# Patient Record
Sex: MALE | Race: BLACK OR AFRICAN AMERICAN | NOT HISPANIC OR LATINO | URBAN - METROPOLITAN AREA
[De-identification: names, ages, dates, MRNs, and addresses within clinical notes are randomized per-mention and may not be internally consistent; named-entity substitution may affect disease eponyms.]

---

## 2018-04-24 ENCOUNTER — EMERGENCY (EMERGENCY)
Facility: HOSPITAL | Age: 38
LOS: 0 days | Discharge: HOME | End: 2018-04-24
Attending: EMERGENCY MEDICINE | Admitting: EMERGENCY MEDICINE
Payer: COMMERCIAL

## 2018-04-24 VITALS
HEART RATE: 56 BPM | OXYGEN SATURATION: 100 % | SYSTOLIC BLOOD PRESSURE: 161 MMHG | RESPIRATION RATE: 18 BRPM | TEMPERATURE: 98 F | DIASTOLIC BLOOD PRESSURE: 95 MMHG

## 2018-04-24 VITALS — WEIGHT: 160.06 LBS | HEIGHT: 73 IN

## 2018-04-24 DIAGNOSIS — M54.9 DORSALGIA, UNSPECIFIED: ICD-10-CM

## 2018-04-24 DIAGNOSIS — Z79.899 OTHER LONG TERM (CURRENT) DRUG THERAPY: ICD-10-CM

## 2018-04-24 PROCEDURE — 72100 X-RAY EXAM L-S SPINE 2/3 VWS: CPT | Mod: 26

## 2018-04-24 PROCEDURE — 99284 EMERGENCY DEPT VISIT MOD MDM: CPT

## 2018-04-24 PROCEDURE — 72070 X-RAY EXAM THORAC SPINE 2VWS: CPT | Mod: 26

## 2018-04-24 RX ORDER — METHOCARBAMOL 500 MG/1
2 TABLET, FILM COATED ORAL
Qty: 30 | Refills: 0 | OUTPATIENT
Start: 2018-04-24 | End: 2018-04-28

## 2018-04-24 RX ORDER — IBUPROFEN 200 MG
600 TABLET ORAL ONCE
Qty: 0 | Refills: 0 | Status: COMPLETED | OUTPATIENT
Start: 2018-04-24 | End: 2018-04-24

## 2018-04-24 RX ORDER — IBUPROFEN 200 MG
1 TABLET ORAL
Qty: 28 | Refills: 0 | OUTPATIENT
Start: 2018-04-24 | End: 2018-04-30

## 2018-04-24 RX ORDER — METHOCARBAMOL 500 MG/1
1000 TABLET, FILM COATED ORAL ONCE
Qty: 0 | Refills: 0 | Status: COMPLETED | OUTPATIENT
Start: 2018-04-24 | End: 2018-04-24

## 2018-04-24 RX ADMIN — METHOCARBAMOL 1000 MILLIGRAM(S): 500 TABLET, FILM COATED ORAL at 19:38

## 2018-04-24 RX ADMIN — Medication 600 MILLIGRAM(S): at 18:55

## 2018-04-24 NOTE — ED STATDOCS - NS_ ATTENDINGSCRIBEDETAILS _ED_A_ED_FT
I, Darrell White MD,  performed the initial face to face bedside interview with this patient regarding history of present illness, review of symptoms and relevant past medical, social and family history.  I completed an independent physical examination.    The history, relevant review of systems, past medical and surgical history, medical decision making, and physical examination was documented by the scribe in my presence and I attest to the accuracy of the documentation.

## 2018-04-24 NOTE — ED STATDOCS - ATTENDING CONTRIBUTION TO CARE
I, Darrell White MD,  performed the initial face to face bedside interview with this patient regarding history of present illness, review of symptoms and relevant past medical, social and family history.  I completed an independent physical examination.  I was the initial provider who evaluated this patient. I have signed out the follow up of any pending tests (i.e. labs, radiological studies) to the ACP.  I have communicated the patient’s plan of care and disposition with the ACP.

## 2018-04-24 NOTE — ED ADULT NURSE NOTE - CHPI ED SYMPTOMS NEG
no tingling/no deformity/no numbness/no abrasion/no bleeding/no fever/no loss of consciousness/no vomiting/no weakness/no confusion

## 2018-04-24 NOTE — ED STATDOCS - PROGRESS NOTE DETAILS
ASA Scales:   Patient has been seen, evaluated and orders have been written by the attending in intake. Patient is stable.  I will follow up the results of orders written and I will continue to evaluate/observe the patient.

## 2018-04-24 NOTE — ED ADULT TRIAGE NOTE - CHIEF COMPLAINT QUOTE
c/o slip and fall down 6 steps 2 days ago, denies hitting head, denies LOC, c/o posterior neck and back pain, pt states he has taken tylenol for pain with no relief in pain, pt does not take blood thinner, HX: depression

## 2018-04-24 NOTE — ED STATDOCS - OBJECTIVE STATEMENT
37 y/o M presents to ED for evaluation s/p fall down 6 steps. No head injury or LOC. Pt states since he fell he has had progressively worsening back pain from the base of his neck to his lower back. Pain is described as shooting. Pt has taken OTC tylenol w/o relief. No numbness, tingling, weakness, or incontinence. Pt notes he lifts heavy objects at work.

## 2018-07-24 ENCOUNTER — EMERGENCY (EMERGENCY)
Facility: HOSPITAL | Age: 38
LOS: 0 days | Discharge: HOME | End: 2018-07-24
Attending: STUDENT IN AN ORGANIZED HEALTH CARE EDUCATION/TRAINING PROGRAM | Admitting: STUDENT IN AN ORGANIZED HEALTH CARE EDUCATION/TRAINING PROGRAM
Payer: SELF-PAY

## 2018-07-24 VITALS
OXYGEN SATURATION: 100 % | HEIGHT: 73 IN | DIASTOLIC BLOOD PRESSURE: 101 MMHG | SYSTOLIC BLOOD PRESSURE: 159 MMHG | WEIGHT: 160.06 LBS | RESPIRATION RATE: 16 BRPM | TEMPERATURE: 98 F | HEART RATE: 60 BPM

## 2018-07-24 DIAGNOSIS — R60.0 LOCALIZED EDEMA: ICD-10-CM

## 2018-07-24 DIAGNOSIS — M79.645 PAIN IN LEFT FINGER(S): ICD-10-CM

## 2018-07-24 DIAGNOSIS — L03.114 CELLULITIS OF LEFT UPPER LIMB: ICD-10-CM

## 2018-07-24 DIAGNOSIS — S60.512A ABRASION OF LEFT HAND, INITIAL ENCOUNTER: ICD-10-CM

## 2018-07-24 PROCEDURE — 99284 EMERGENCY DEPT VISIT MOD MDM: CPT

## 2018-07-24 PROCEDURE — 73130 X-RAY EXAM OF HAND: CPT | Mod: 26,LT

## 2018-07-24 RX ORDER — MUPIROCIN 20 MG/G
1 OINTMENT TOPICAL
Qty: 1 | Refills: 0 | OUTPATIENT
Start: 2018-07-24 | End: 2018-08-02

## 2018-07-24 RX ORDER — IBUPROFEN 200 MG
600 TABLET ORAL ONCE
Qty: 0 | Refills: 0 | Status: COMPLETED | OUTPATIENT
Start: 2018-07-24 | End: 2018-07-24

## 2018-07-24 RX ORDER — TETANUS TOXOID, REDUCED DIPHTHERIA TOXOID AND ACELLULAR PERTUSSIS VACCINE, ADSORBED 5; 2.5; 8; 8; 2.5 [IU]/.5ML; [IU]/.5ML; UG/.5ML; UG/.5ML; UG/.5ML
0.5 SUSPENSION INTRAMUSCULAR ONCE
Qty: 0 | Refills: 0 | Status: COMPLETED | OUTPATIENT
Start: 2018-07-24 | End: 2018-07-24

## 2018-07-24 RX ADMIN — TETANUS TOXOID, REDUCED DIPHTHERIA TOXOID AND ACELLULAR PERTUSSIS VACCINE, ADSORBED 0.5 MILLILITER(S): 5; 2.5; 8; 8; 2.5 SUSPENSION INTRAMUSCULAR at 20:06

## 2018-07-24 RX ADMIN — Medication 600 MILLIGRAM(S): at 20:06

## 2018-07-24 RX ADMIN — Medication 100 MILLIGRAM(S): at 20:06

## 2018-07-24 NOTE — ED ADULT NURSE NOTE - OBJECTIVE STATEMENT
pt presents to ED s/p mechanical fall last night with abrasions to the L. 2nd finger. unknown last tetanus. swelling to the L. hand and finger. ASA Rivers removed ring from 2nd finger. pt in no acute distress at this time.

## 2018-07-24 NOTE — ED STATDOCS - SKIN, MLM
skin normal color for race, warm, dry. +abrasions distal to 3rd and 4th digit. soft tissue swelling to dorsal left hand with mild erythema, swelling, and warmth. skin normal color for race, warm, dry. +abrasions to MCP joints of 3rd and 4th digit. soft tissue swelling to dorsum of left hand with mild erythema, swelling, and warmth.

## 2018-07-24 NOTE — ED STATDOCS - PROGRESS NOTE DETAILS
Patient seen and evaluated with ED attending at intake.  healing abrasions to dorsum of left hand over MCP of 4th and 5th fingers.  L ring finger swollen, lubricant used to remove ring in place and patient instructed not to wear it until infection clears.  No free air on xray, patient afebrile, denies fever and chills.  Will trial PO antibiotics outpatient, strict return precautions for worsening infection, fever, chills reviewed with patient -Silvestre Mac PA-C

## 2018-07-24 NOTE — ED STATDOCS - OBJECTIVE STATEMENT
39 y/o male with no pertinent PMHx presents to the ED s/p mechanical fall last night c/o left finger pain with abrasions and new swelling and erythema. Currently does not have a PMD. Pt is unsure if Tetanus is UTD. 39 y/o male with no pertinent PMHx presents to the ED s/p mechanical fall 1 week ago; c/o left finger pain with abrasions and new swelling and erythema since last night; no hx of diabetes; no fever or chills; Currently does not have a PMD. Pt is unsure if Tetanus is UTD.

## 2018-07-24 NOTE — ED STATDOCS - ATTENDING CONTRIBUTION TO CARE
I, Jessica Pelaez DO,  performed the initial face to face bedside interview with this patient regarding history of present illness, review of symptoms and relevant past medical, social and family history.  I completed an independent physical examination.  I was the initial provider who evaluated this patient. I have signed out the follow up of any pending tests (i.e. labs, radiological studies) to the ACP.  I have communicated the patient’s plan of care and disposition with the ACP.  The history, relevant review of systems, past medical and surgical history, medical decision making, and physical examination was documented by the scribe in my presence and I attest to the accuracy of the documentation.

## 2018-07-24 NOTE — ED ADULT NURSE NOTE - CHPI ED SYMPTOMS NEG
no fever/no nausea/no numbness/no chills/no weakness/no dizziness/no tingling/no vomiting/no decreased eating/drinking

## 2018-07-24 NOTE — ED ADULT TRIAGE NOTE - CHIEF COMPLAINT QUOTE
left hand lac sustained last week now with redness, swelling and increased pain. denies fevers, chills.

## 2019-05-15 ENCOUNTER — EMERGENCY (EMERGENCY)
Facility: HOSPITAL | Age: 39
LOS: 0 days | Discharge: HOME | End: 2019-05-15
Attending: EMERGENCY MEDICINE | Admitting: EMERGENCY MEDICINE
Payer: COMMERCIAL

## 2019-05-15 VITALS — HEIGHT: 71 IN | WEIGHT: 154.98 LBS

## 2019-05-15 VITALS
HEART RATE: 59 BPM | RESPIRATION RATE: 16 BRPM | OXYGEN SATURATION: 100 % | SYSTOLIC BLOOD PRESSURE: 154 MMHG | TEMPERATURE: 99 F | DIASTOLIC BLOOD PRESSURE: 85 MMHG

## 2019-05-15 DIAGNOSIS — M25.572 PAIN IN LEFT ANKLE AND JOINTS OF LEFT FOOT: ICD-10-CM

## 2019-05-15 DIAGNOSIS — S93.402A SPRAIN OF UNSPECIFIED LIGAMENT OF LEFT ANKLE, INITIAL ENCOUNTER: ICD-10-CM
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
## 2019-05-15 PROCEDURE — 99283 EMERGENCY DEPT VISIT LOW MDM: CPT | Mod: 25

## 2019-05-15 PROCEDURE — 73610 X-RAY EXAM OF ANKLE: CPT | Mod: 26,LT

## 2019-05-15 RX ORDER — IBUPROFEN 200 MG
400 TABLET ORAL ONCE
Refills: 0 | Status: COMPLETED | OUTPATIENT
Start: 2019-05-15 | End: 2019-05-15

## 2019-05-15 RX ADMIN — Medication 400 MILLIGRAM(S): at 12:46

## 2019-05-15 NOTE — ED ADULT NURSE NOTE - OBJECTIVE STATEMENT
Patient states he rolled his left ankle this morning, and heard a "pop".  He states he has pain with weight bearing.

## 2019-05-15 NOTE — ED STATDOCS - CARE PROVIDER_API CALL
Antonio Olmstead (DO)  Orthopaedic Surgery  [address] [place] [address]  Phone: [phone]  Fax: [phone]  Follow Up Time: 4-6 Days

## 2019-05-15 NOTE — ED STATDOCS - NSFOLLOWUPINSTRUCTIONS_ED_ALL_ED_FT
Sprain    WHAT YOU NEED TO KNOW:    A sprain happens when a ligament is stretched or torn. Ligaments are tough tissues that connect bones. Ligaments support your joints and keep your bones in place. They allow you to lift, lower, or rotate your arms and legs. A sprain may involve one or more ligaments.     DISCHARGE INSTRUCTIONS:    Return to the emergency department if:     You have numbness or tingling below the injury, such as in your fingers or toes.      The skin over your sprained area is blue or pale.       Your pain has increased or returned, even after you take pain medicine.    Contact your healthcare provider if:     Your symptoms do not better.      Your swelling has increased or returned.      Your joint becomes more weak or unstable.      You have questions or concerns about your condition or care.    Medicines:     Prescription pain medicine may be given. Ask how to take this medicine safely.      Acetaminophen decreases pain and fever. It is available without a doctor's order. Ask how much to take and how often to take it. Follow directions. Acetaminophen can cause liver damage if not taken correctly.      NSAIDs, such as ibuprofen, help decrease swelling, pain, and fever. This medicine is available with or without a doctor's order. NSAIDs can cause stomach bleeding or kidney problems in certain people. If you take blood thinner medicine, always ask your healthcare provider if NSAIDs are safe for you. Always read the medicine label and follow directions.      Take your medicine as directed. Contact your healthcare provider if you think your medicine is not helping or if you have side effects. Tell him or her if you are allergic to any medicine. Keep a list of the medicines, vitamins, and herbs you take. Include the amounts, and when and why you take them. Bring the list or the pill bottles to follow-up visits. Carry your medicine list with you in case of an emergency.    Support devices: Support services, such as an elastic bandage, splint, brace, or cast may be needed. These devices limit your movement and protect your joint. You may need to use crutches if the sprain is in your leg. This will help decrease your pain as you move around.     Self-care:     Rest your joint so that it can heal. Return to normal activities as directed.      Apply ice on your injury for 15 to 20 minutes every hour or as directed. Use an ice pack, or put crushed ice in a plastic bag. Cover it with a towel. Ice helps prevent tissue damage and decreases swelling and pain.      Compress the injured area as directed. Ask your healthcare provider if you should wrap an elastic bandage around your injured ligament. An elastic bandage provides support and helps decrease swelling and movement so your joint can heal.       Elevate the injured area above the level of your heart as often as you can. This will help decrease swelling and pain. Prop the injured area on pillows or blankets to keep it elevated comfortably.     Physical therapy: A physical therapist teaches you exercises to help improve movement and strength, and to decrease pain.     Prevent another sprain: Regular exercise can strengthen your muscles and help prevent another injury. Do the following before you begin or return to regular exercise or sports training:     Ask your healthcare provider about the activities you can do. Find out how long your ligament needs to heal. Do not do any physical activity until your healthcare provider says it is okay. If you start activity too soon, you may develop a more serious injury.       Always warm up and stretch before your regular exercise, sport, or physical activity.       Take it slow. Slowly increase how often and how long you exercise or train. Sudden increases in how often you train may cause you to overstretch or tear your ligament.       Use the right equipment. Always wear shoes that fit well and are made for the activity that you are doing. You may also use ankle supports, elbow and knee pads, or braces.     Follow up with your healthcare provider as directed: Write down your questions so you remember to ask them during your visits.

## 2019-05-15 NOTE — ED STATDOCS - ATTENDING CONTRIBUTION TO CARE
I, Regine Dailey MD, personally saw the patient with ACP.  I have personally performed a face to face diagnostic evaluation on this patient.  I have reviewed the ACP note and agree with the history, exam, and plan of care, except as noted.

## 2019-05-15 NOTE — ED STATDOCS - PROGRESS NOTE DETAILS
38 y/o M presents with L ankle pain. pt states he rolled his ankle and felt a pop, pain worse with ambulating. Denies numbness or tingling, motor or sensory deficit or other complaints at this time. -Negro Leal PA-C Xr negative. Ace bandage and stirrup applied, will dc with crutches. Recommend RICE therapy, FU with ortho.  Pt asked to return to ED immediately for any new or concerning sx or worsening sx. Pt acknowledges and understands plan. -Negro Leal PA-C

## 2019-05-15 NOTE — ED STATDOCS - OBJECTIVE STATEMENT
38 y/o male with no significant PMHx presents to the ED c/o left ankle pain. Pt notes he rolled his ankle this morning and heard a pop. Pt able to ambulate with some pain. No other complaints at this time.

## 2019-12-09 ENCOUNTER — EMERGENCY (EMERGENCY)
Facility: HOSPITAL | Age: 39
LOS: 0 days | Discharge: HOME | End: 2019-12-09
Attending: EMERGENCY MEDICINE
Payer: COMMERCIAL

## 2019-12-09 VITALS
HEIGHT: 78 IN | WEIGHT: 169.98 LBS | OXYGEN SATURATION: 100 % | SYSTOLIC BLOOD PRESSURE: 171 MMHG | HEART RATE: 88 BPM | DIASTOLIC BLOOD PRESSURE: 105 MMHG | RESPIRATION RATE: 18 BRPM | TEMPERATURE: 98 F

## 2019-12-09 DIAGNOSIS — S60.415A ABRASION OF LEFT RING FINGER, INITIAL ENCOUNTER: ICD-10-CM

## 2019-12-09 DIAGNOSIS — S60.455A SUPERFICIAL FOREIGN BODY OF LEFT RING FINGER, INITIAL ENCOUNTER: ICD-10-CM
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
## 2019-12-09 DIAGNOSIS — F17.200 NICOTINE DEPENDENCE, UNSPECIFIED, UNCOMPLICATED: ICD-10-CM
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
## 2019-12-09 DIAGNOSIS — I10 ESSENTIAL (PRIMARY) HYPERTENSION: ICD-10-CM

## 2019-12-09 PROCEDURE — 99282 EMERGENCY DEPT VISIT SF MDM: CPT

## 2019-12-09 RX ORDER — TETANUS TOXOID, REDUCED DIPHTHERIA TOXOID AND ACELLULAR PERTUSSIS VACCINE, ADSORBED 5; 2.5; 8; 8; 2.5 [IU]/.5ML; [IU]/.5ML; UG/.5ML; UG/.5ML; UG/.5ML
0.5 SUSPENSION INTRAMUSCULAR ONCE
Refills: 0 | Status: DISCONTINUED | OUTPATIENT
Start: 2019-12-09 | End: 2019-12-09

## 2019-12-09 NOTE — ED ADULT NURSE NOTE - NSIMPLEMENTINTERV_GEN_ALL_ED
Implemented All Universal Safety Interventions:  [place] to call system. Call bell, personal items and telephone within reach. Instruct patient to call for assistance. Room bathroom lighting operational. Non-slip footwear when patient is off stretcher. Physically safe environment: no spills, clutter or unnecessary equipment. Stretcher in lowest position, wheels locked, appropriate side rails in place.

## 2019-12-09 NOTE — ED ADULT TRIAGE NOTE - CHIEF COMPLAINT QUOTE
Patient presents with left ringer swelling, states he injured it two days ago and now his wedding band is stuck

## 2019-12-09 NOTE — ED STATDOCS - PROGRESS NOTE DETAILS
Patient is a 40 y/o male with PMHx of HTN who presents with a stuck ring in his left 4th finger. Patient has been wearing it for 2 years without any problems but noticed some swelling yesterday. Patient tried to remove his ring but was unable to remove because of swelling. ~ASA Gomez Metallic Ring removed using electric ring cutter. No injury to finger. Small abrasion on proximal phalanx cleansed with NS, bacitracin DSD applied. Tetanus updated.     Patient re-examined and re-evaluated. Patient feels much better at this time. ED evaluation, Diagnosis and management discussed with the patient in detail. Workup results discussed with ED attending, OK to dc home. Close PMD follow up encouraged.  Strict ED return instructions discussed in detail and patient given the opportunity to ask any questions about their discharge diagnosis and instructions. Patient verbalized understanding. ~ ASA Gomez Patient is a 40 y/o male with PMHx of HTN who presents with a stuck ring in his left 4th finger. Patient has been wearing it for 2 years without any problems but noticed some swelling yesterday. Patient tried to remove his ring but was unable to remove because of swelling. Last tetanus 2 years ago. ~ASA Gomez Metallic Ring removed using electric ring cutter. No injury to finger. Small abrasion on proximal phalanx cleansed with NS, bacitracin DSD applied.      Patient re-examined and re-evaluated. Patient feels much better at this time. ED evaluation, Diagnosis and management discussed with the patient in detail. Workup results discussed with ED attending, OK to MN home. Close PMD follow up encouraged.  Strict ED return instructions discussed in detail and patient given the opportunity to ask any questions about their discharge diagnosis and instructions. Patient verbalized understanding. ~ ASA Gomez

## 2019-12-09 NOTE — ED STATDOCS - PHYSICAL EXAMINATION
GEN: AOX3, NAD. HEENT: Throat clear. Airway is patent. EYES: PERRLA. EOMI. Head: NC/AT. NECK: Supple, No JVD. FROM. C-spine non-tender. CV:S1S2, RRR, LUNGS: CTA b/l, no w/r/r. CHEST: Equal chest expansion and rise. No deformity. ABD: Soft, NT/ND, no rebound, no guarding. No CVAT. EXT: No e/c/c. 2+ distal pulses. LEFT RING FINGER: +metal ring noted at base of left 4th digit's proximal phalanx. Mild swelling surrounding ring. +Mild diffuse tenderness. NVI. No vascular compromise. Cap refill less than 2 sec. No tendon deficits. No bony deformity. SKIN: No rashes. NEURO: No focal deficits. CN II-XII intact. FROM. 5/5 motor and sensory. ASA Gomez

## 2019-12-09 NOTE — ED PROCEDURE NOTE - CPROC ED FOREIGN BODY DETAIL1
electric ring cutter used to cut ring successfully. Nose pliers used to split open the ring. Bacitracin DSD applied.

## 2019-12-09 NOTE — ED STATDOCS - DATE/TIME 1
MARU Fang, medical assistant, will fax script for Phrenilin to patient's pharmacy.    Called patient and left detailed message on identified voicemail relaying message per below from Dr. Beavers and that butalbital is being faxed to pharmacy.      JULY De La Rosa, KEVINN, RN     09-Dec-2019 14:31

## 2019-12-09 NOTE — ED STATDOCS - OBJECTIVE STATEMENT
38 y/o male with a PMHx of HTN presents to the ED c/o left fourth digit swelling since yesterday. Pt unable to get his ring off. No trauma to finger. Smoker. NKDA. No other complaints at this time.

## 2019-12-09 NOTE — ED STATDOCS - PATIENT PORTAL LINK FT
You can access the FollowMyHealth Patient Portal offered by [facility] by registering at the following website: [email or web address]. By joining [employer]’s FollowMyHealth portal, you will also be able to view your health information using other applications (apps) compatible with our system.

## 2019-12-24 ENCOUNTER — EMERGENCY (EMERGENCY)
Facility: HOSPITAL | Age: 39
LOS: 0 days | Discharge: HOME | End: 2019-12-24
Attending: EMERGENCY MEDICINE
Payer: COMMERCIAL

## 2019-12-24 VITALS
RESPIRATION RATE: 18 BRPM | HEART RATE: 56 BPM | DIASTOLIC BLOOD PRESSURE: 102 MMHG | SYSTOLIC BLOOD PRESSURE: 153 MMHG | TEMPERATURE: 99 F | OXYGEN SATURATION: 100 %

## 2019-12-24 VITALS — WEIGHT: 167.99 LBS | HEIGHT: 72 IN

## 2019-12-24 DIAGNOSIS — R50.9 FEVER, UNSPECIFIED: ICD-10-CM

## 2019-12-24 DIAGNOSIS — M79.10 MYALGIA, UNSPECIFIED SITE: ICD-10-CM

## 2019-12-24 DIAGNOSIS — I10 ESSENTIAL (PRIMARY) HYPERTENSION: ICD-10-CM

## 2019-12-24 DIAGNOSIS — B34.9 VIRAL INFECTION, UNSPECIFIED: ICD-10-CM

## 2019-12-24 DIAGNOSIS — R10.9 UNSPECIFIED ABDOMINAL PAIN: ICD-10-CM

## 2019-12-24 LAB
ALBUMIN SERPL ELPH-MCNC: 3.8 G/DL — SIGNIFICANT CHANGE UP (ref 3.3–5)
ALP SERPL-CCNC: 71 U/L — SIGNIFICANT CHANGE UP (ref 40–120)
ALT FLD-CCNC: 42 U/L — SIGNIFICANT CHANGE UP (ref 12–78)
ANION GAP SERPL CALC-SCNC: 1 MMOL/L — LOW (ref 5–17)
AST SERPL-CCNC: 47 U/L — HIGH (ref 15–37)
BASOPHILS # BLD AUTO: 0.02 K/UL — SIGNIFICANT CHANGE UP (ref 0–0.2)
BASOPHILS NFR BLD AUTO: 0.4 % — SIGNIFICANT CHANGE UP (ref 0–2)
BILIRUB SERPL-MCNC: 0.4 MG/DL — SIGNIFICANT CHANGE UP (ref 0.2–1.2)
BUN SERPL-MCNC: 10 MG/DL — SIGNIFICANT CHANGE UP (ref 7–23)
CALCIUM SERPL-MCNC: 8.9 MG/DL — SIGNIFICANT CHANGE UP (ref 8.5–10.1)
CHLORIDE SERPL-SCNC: 109 MMOL/L — HIGH (ref 96–108)
CO2 SERPL-SCNC: 31 MMOL/L — SIGNIFICANT CHANGE UP (ref 22–31)
CREAT SERPL-MCNC: 1.12 MG/DL — SIGNIFICANT CHANGE UP (ref 0.5–1.3)
EOSINOPHIL # BLD AUTO: 0.07 K/UL — SIGNIFICANT CHANGE UP (ref 0–0.5)
EOSINOPHIL NFR BLD AUTO: 1.5 % — SIGNIFICANT CHANGE UP (ref 0–6)
FLU A RESULT: SIGNIFICANT CHANGE UP
FLU A RESULT: SIGNIFICANT CHANGE UP
FLUAV AG NPH QL: SIGNIFICANT CHANGE UP
FLUBV AG NPH QL: SIGNIFICANT CHANGE UP
GLUCOSE SERPL-MCNC: 99 MG/DL — SIGNIFICANT CHANGE UP (ref 70–99)
HCT VFR BLD CALC: 42.8 % — SIGNIFICANT CHANGE UP (ref 39–50)
HGB BLD-MCNC: 13.8 G/DL — SIGNIFICANT CHANGE UP (ref 13–17)
IMM GRANULOCYTES NFR BLD AUTO: 0.2 % — SIGNIFICANT CHANGE UP (ref 0–1.5)
LYMPHOCYTES # BLD AUTO: 1.64 K/UL — SIGNIFICANT CHANGE UP (ref 1–3.3)
LYMPHOCYTES # BLD AUTO: 34.1 % — SIGNIFICANT CHANGE UP (ref 13–44)
MCHC RBC-ENTMCNC: 29.4 PG — SIGNIFICANT CHANGE UP (ref 27–34)
MCHC RBC-ENTMCNC: 32.2 GM/DL — SIGNIFICANT CHANGE UP (ref 32–36)
MCV RBC AUTO: 91.3 FL — SIGNIFICANT CHANGE UP (ref 80–100)
MONOCYTES # BLD AUTO: 0.64 K/UL — SIGNIFICANT CHANGE UP (ref 0–0.9)
MONOCYTES NFR BLD AUTO: 13.3 % — SIGNIFICANT CHANGE UP (ref 2–14)
NEUTROPHILS # BLD AUTO: 2.43 K/UL — SIGNIFICANT CHANGE UP (ref 1.8–7.4)
NEUTROPHILS NFR BLD AUTO: 50.5 % — SIGNIFICANT CHANGE UP (ref 43–77)
PLATELET # BLD AUTO: 180 K/UL — SIGNIFICANT CHANGE UP (ref 150–400)
POTASSIUM SERPL-MCNC: 4.1 MMOL/L — SIGNIFICANT CHANGE UP (ref 3.5–5.3)
POTASSIUM SERPL-SCNC: 4.1 MMOL/L — SIGNIFICANT CHANGE UP (ref 3.5–5.3)
PROT SERPL-MCNC: 7.3 GM/DL — SIGNIFICANT CHANGE UP (ref 6–8.3)
RBC # BLD: 4.69 M/UL — SIGNIFICANT CHANGE UP (ref 4.2–5.8)
RBC # FLD: 13.9 % — SIGNIFICANT CHANGE UP (ref 10.3–14.5)
RSV RESULT: SIGNIFICANT CHANGE UP
RSV RNA RESP QL NAA+PROBE: SIGNIFICANT CHANGE UP
SODIUM SERPL-SCNC: 141 MMOL/L — SIGNIFICANT CHANGE UP (ref 135–145)
WBC # BLD: 4.81 K/UL — SIGNIFICANT CHANGE UP (ref 3.8–10.5)
WBC # FLD AUTO: 4.81 K/UL — SIGNIFICANT CHANGE UP (ref 3.8–10.5)

## 2019-12-24 PROCEDURE — 36415 COLL VENOUS BLD VENIPUNCTURE: CPT

## 2019-12-24 PROCEDURE — 87631 RESP VIRUS 3-5 TARGETS: CPT

## 2019-12-24 PROCEDURE — 96374 THER/PROPH/DIAG INJ IV PUSH: CPT

## 2019-12-24 PROCEDURE — 99284 EMERGENCY DEPT VISIT MOD MDM: CPT

## 2019-12-24 PROCEDURE — 80053 COMPREHEN METABOLIC PANEL: CPT

## 2019-12-24 PROCEDURE — 99284 EMERGENCY DEPT VISIT MOD MDM: CPT | Mod: 25

## 2019-12-24 PROCEDURE — 85025 COMPLETE CBC W/AUTO DIFF WBC: CPT

## 2019-12-24 RX ORDER — KETOROLAC TROMETHAMINE 30 MG/ML
30 SYRINGE (ML) INJECTION ONCE
Refills: 0 | Status: DISCONTINUED | OUTPATIENT
Start: 2019-12-24 | End: 2019-12-24

## 2019-12-24 RX ORDER — ACETAMINOPHEN 500 MG
975 TABLET ORAL ONCE
Refills: 0 | Status: COMPLETED | OUTPATIENT
Start: 2019-12-24 | End: 2019-12-24

## 2019-12-24 RX ORDER — SODIUM CHLORIDE 9 MG/ML
1000 INJECTION INTRAMUSCULAR; INTRAVENOUS; SUBCUTANEOUS ONCE
Refills: 0 | Status: COMPLETED | OUTPATIENT
Start: 2019-12-24 | End: 2019-12-24

## 2019-12-24 RX ADMIN — Medication 975 MILLIGRAM(S): at 16:42

## 2019-12-24 RX ADMIN — Medication 30 MILLIGRAM(S): at 16:41

## 2019-12-24 RX ADMIN — SODIUM CHLORIDE 1000 MILLILITER(S): 9 INJECTION INTRAMUSCULAR; INTRAVENOUS; SUBCUTANEOUS at 16:41

## 2019-12-24 NOTE — ED STATDOCS - PROGRESS NOTE DETAILS
39 yr. old male PMH: HTN presents to ED with body aches and chills onset yesterday afternoon with subjective fever today. +abdominal pain. No N/V/D. Taking Sudafed without much relief. Seen an examined by attending in Intake. Plan: IV,IVF, Labs, CT abd./pelvis. Will F/U with results and re evaluate. Mundo NP Reviewed results of Lab work with negative Flu. Reports he is feeling better. Instructed to drink plenty of fluids. Take Ibuprofen or Tylenol for pain and F/U with PMD. Mundo NP

## 2019-12-24 NOTE — ED STATDOCS - CARE PROVIDER_API CALL
Co, Fermin PHOENIX)  Internal Medicine  [address] [place] [address]  Phone: [phone]  Fax: [phone]  Follow Up Time:     Ronald Anna)  Family Medicine  [address] [place] [address]  Phone: [phone]  Fax: [phone]  Follow Up Time:

## 2019-12-24 NOTE — ED STATDOCS - ENMT, MLM
Nasal mucosa clear.  Mouth with normal mucosa  Throat has no vesicles, no oropharyngeal exudates and uvula is midline. no lymphadenopathy

## 2019-12-24 NOTE — ED STATDOCS - PATIENT PORTAL LINK FT
You can access the FollowMyHealth Patient Portal offered by [facility] by registering at the following website: [email or web address]. By joining [employer]’s FollowMyHealth portal, you will also be able to view your health information using other applications (apps) compatible with our system.

## 2019-12-24 NOTE — ED ADULT TRIAGE NOTE - CHIEF COMPLAINT QUOTE
Patient comes in with flu symptoms x 2 days. Patient c/o cough, body aches, headache and chills. Patient cp/sob.

## 2019-12-24 NOTE — ED ADULT NURSE NOTE - NSFALLRSKPASTHIST_ED_ALL_ED
Cardiology Clinic Progress Note  Sandra K Severson MRN# [id number]   YOB: 1950 Age: 67 year old     Reason For Visit:  7-month follow-up    Primary Cardiologist:   Dr. Tovar          History of Presenting Illness:    Sandra K Severson is a pleasant 67 year old patient with a past cardiac history significant for CAD status post inferior MI 2001 with thrombectomy and stent to the  RCA, stent to the mid circumflex in 2016, hypertension, and hyperlipidemia.    Pt was last seen by Dr. Tovar 2/7/2018.  She had been doing well without any cardiac symptoms.  Prior stress test showed no ischemia or infarct. Most recent echocardiogram 2016 with LVEF 50-55%, slight basilar inferolateral hypokinesis, no regional wall motion abnormality, and no significant valvular disease. She continued to try and lose weight and six-month follow-up was recommended.      Pt presents today for six-month follow-up.  She has moved to [place] but wishes to continue following with our cardiology group.  She does see her PCP in [facility] where she lives.  BMP today showing normal renal function and electrolytes.  Lipid profile with total cholesterol 150 HDL 59 LDL 74 and triglycerides 87.  These results were reviewed with her today.  Since she was last seen, she has not had any cardiac complaints.  She denies any angina.  She has not been checking her blood pressures at home recently.  Blood pressure today in the clinic is mildly elevated at 138/81.  She has noted that in the past her blood pressures have been elevated when she comes to the clinic but have been well controlled at home.  She will check blood pressures at home and call us if they are greater than 130 systolically. Patient reports no chest pain, shortness of breath, PND, orthopnea, presyncope, syncope, edema, heart racing, or palpitations.      Current Cardiac Medications    metoprolol XL 50 mg daily  Lisinopril 30 mg daily  Atorvastatin 80 mg  daily  Nitroglycerin p.r.n.  Aspirin 81 mg daily   Zetia 10 mg daily                     Assessment and Plan:     Plan  1.  Call the clinic if your blood pressure is consistently greater than 130/80.   2.  Echocardiogram to reassess LV function, per Dr. Tovar  3.  Follow-up with cardiologist in 6 months      1. CAD    status post inferior MI 2001 with stent to the RCA and stent to the midcircumflex in 2016    no angina    Continue statin, aspirin, ACE inhibitor, beta blocker      2. Hypertension    not well controlled in clinic but controlled at home     continue metoprolol, lisinopril      3. Hyperlipidemia    LDL 74 today    continue atorvastatin 80 mg daily and zetia          Thank you for allowing me to participate in this delightful patient's care.      This note was completed in part using Dragon voice recognition software. Although reviewed after completion, some word and grammatical errors may occur.    TIMOTHY Rooney, CNP           Data:   All laboratory data reviewed     no

## 2019-12-24 NOTE — ED STATDOCS - OBJECTIVE STATEMENT
38 y/o male with a PMHx of HTN presents to the ED c/o body aches and chills starting yesterday afternoon and a subjective fever today. Pt has some abd pain but no vomiting or diarrhea. Pt took Sudafed with no relief. Pt did not get a flu shot this year.

## 2019-12-24 NOTE — ED ADULT NURSE NOTE - OBJECTIVE STATEMENT
pt presents to Ed with complaints of chills, body aches, and abdominal pain. pt states symptoms started yesterday. 18 g placed to left AC. labs sent.

## 2021-07-27 ENCOUNTER — EMERGENCY (EMERGENCY)
Facility: HOSPITAL | Age: 41
LOS: 0 days | Discharge: HOME | End: 2021-07-27
Attending: STUDENT IN AN ORGANIZED HEALTH CARE EDUCATION/TRAINING PROGRAM
Payer: MEDICAID

## 2021-07-27 VITALS
OXYGEN SATURATION: 99 % | DIASTOLIC BLOOD PRESSURE: 96 MMHG | RESPIRATION RATE: 16 BRPM | SYSTOLIC BLOOD PRESSURE: 135 MMHG | HEART RATE: 67 BPM

## 2021-07-27 VITALS — WEIGHT: 175.05 LBS | HEIGHT: 72 IN

## 2021-07-27 DIAGNOSIS — M54.5 LOW BACK PAIN: ICD-10-CM
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
## 2021-07-27 DIAGNOSIS — I10 ESSENTIAL (PRIMARY) HYPERTENSION: ICD-10-CM
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
## 2021-07-27 PROCEDURE — 99284 EMERGENCY DEPT VISIT MOD MDM: CPT

## 2021-07-27 PROCEDURE — 96372 THER/PROPH/DIAG INJ SC/IM: CPT

## 2021-07-27 PROCEDURE — 99283 EMERGENCY DEPT VISIT LOW MDM: CPT | Mod: 25

## 2021-07-27 RX ORDER — IBUPROFEN 200 MG
1 TABLET ORAL
Qty: 20 | Refills: 0
Start: 2021-07-27 | End: 2021-07-31

## 2021-07-27 RX ORDER — LIDOCAINE 4 G/100G
1 CREAM TOPICAL ONCE
Refills: 0 | Status: DISCONTINUED | OUTPATIENT
Start: 2021-07-27 | End: 2021-07-27

## 2021-07-27 RX ORDER — METHOCARBAMOL 500 MG/1
1 TABLET, FILM COATED ORAL
Qty: 9 | Refills: 0
Start: 2021-07-27 | End: 2021-07-29

## 2021-07-27 RX ORDER — METHOCARBAMOL 500 MG/1
750 TABLET, FILM COATED ORAL ONCE
Refills: 0 | Status: COMPLETED | OUTPATIENT
Start: 2021-07-27 | End: 2021-07-27

## 2021-07-27 RX ORDER — LIDOCAINE 4 G/100G
1 CREAM TOPICAL
Qty: 5 | Refills: 0
Start: 2021-07-27 | End: 2021-07-31

## 2021-07-27 RX ORDER — KETOROLAC TROMETHAMINE 30 MG/ML
30 SYRINGE (ML) INJECTION ONCE
Refills: 0 | Status: DISCONTINUED | OUTPATIENT
Start: 2021-07-27 | End: 2021-07-27

## 2021-07-27 RX ORDER — LIDOCAINE 4 G/100G
1 CREAM TOPICAL ONCE
Refills: 0 | Status: COMPLETED | OUTPATIENT
Start: 2021-07-27 | End: 2021-07-27

## 2021-07-27 RX ADMIN — Medication 30 MILLIGRAM(S): at 09:09

## 2021-07-27 RX ADMIN — METHOCARBAMOL 750 MILLIGRAM(S): 500 TABLET, FILM COATED ORAL at 09:09

## 2021-07-27 RX ADMIN — LIDOCAINE 1 PATCH: 4 CREAM TOPICAL at 09:08

## 2021-07-27 NOTE — ED PROVIDER NOTE - PROGRESS NOTE DETAILS
Latanya DO: Patient feeling better at this time; pain control; ambulatory in ED; f/u with pmd in 1 day without fail; bp improved; strict return precautions given.

## 2021-07-27 NOTE — ED PROVIDER NOTE - PATIENT PORTAL LINK FT
You can access the FollowMyHealth Patient Portal offered by [facility] by registering at the following website: [email or web address]. By joining [employer]’s FollowMyHealth portal, you will also be able to view your health information using other applications (apps) compatible with our system.

## 2021-07-27 NOTE — ED PROVIDER NOTE - MUSCULOSKELETAL, MLM
Spine appears normal, range of motion is not limited, mild paravertebral lumbar tenderness; no midline tenderness; ambulatory in ED with steady gait;

## 2021-07-27 NOTE — ED PROVIDER NOTE - OBJECTIVE STATEMENT
Patient is a 40 yo male with lower back pain x this AM; reports heavy lifting yesterday; no trauma; no chest pain; no sob; no abdominal pain; no urinary complaints; no incontinence of bowel or bladder function; no weakness; did not take anything for pain prior to arrival; no rash.

## 2022-03-01 NOTE — ED STATDOCS - ATTENDING CONTRIBUTION TO CARE
I, Pardeep Louise, performed the initial face to face bedside interview with this patient regarding history of present illness, review of symptoms and relevant past medical, social and family history.  I completed an independent physical examination.  I was the initial provider who evaluated this patient. I have signed out the follow up of any pending tests (i.e. labs, radiological studies) to the ACP.  I have communicated the patient’s plan of care and disposition with the ACP.  The history, relevant review of systems, past medical and surgical history, medical decision making, and physical examination was documented by the scribe in my presence and I attest to the accuracy of the documentation. O-Z Plasty Text: The defect edges were debeveled with a #15 scalpel blade.  Given the location of the defect, shape of the defect and the proximity to free margins an O-Z plasty (double transposition flap) was deemed most appropriate.  Using a sterile surgical marker, the appropriate transposition flaps were drawn incorporating the defect and placing the expected incisions within the relaxed skin tension lines where possible.    The area thus outlined was incised deep to adipose tissue with a #15 scalpel blade.  The skin margins were undermined to an appropriate distance in all directions utilizing iris scissors.  Hemostasis was achieved with electrocautery.  The flaps were then transposed into place, one clockwise and the other counterclockwise, and anchored with interrupted buried subcutaneous sutures.

## 2022-03-08 ENCOUNTER — EMERGENCY (EMERGENCY)
Facility: HOSPITAL | Age: 42
LOS: 0 days | Discharge: HOME | End: 2022-03-08
Attending: EMERGENCY MEDICINE
Payer: COMMERCIAL

## 2022-03-08 VITALS
SYSTOLIC BLOOD PRESSURE: 175 MMHG | HEIGHT: 72 IN | TEMPERATURE: 99 F | HEART RATE: 57 BPM | OXYGEN SATURATION: 96 % | RESPIRATION RATE: 18 BRPM | WEIGHT: 160.06 LBS | DIASTOLIC BLOOD PRESSURE: 101 MMHG

## 2022-03-08 VITALS
DIASTOLIC BLOOD PRESSURE: 85 MMHG | HEART RATE: 61 BPM | SYSTOLIC BLOOD PRESSURE: 163 MMHG | TEMPERATURE: 98 F | RESPIRATION RATE: 18 BRPM | OXYGEN SATURATION: 100 %

## 2022-03-08 DIAGNOSIS — R11.0 NAUSEA: ICD-10-CM

## 2022-03-08 DIAGNOSIS — R51.9 HEADACHE, UNSPECIFIED: ICD-10-CM

## 2022-03-08 DIAGNOSIS — I10 ESSENTIAL (PRIMARY) HYPERTENSION: ICD-10-CM

## 2022-03-08 LAB
ALBUMIN SERPL ELPH-MCNC: 4.1 G/DL — SIGNIFICANT CHANGE UP (ref 3.3–5)
ALP SERPL-CCNC: 62 U/L — SIGNIFICANT CHANGE UP (ref 40–120)
ALT FLD-CCNC: 23 U/L — SIGNIFICANT CHANGE UP (ref 12–78)
ANION GAP SERPL CALC-SCNC: 1 MMOL/L — LOW (ref 5–17)
AST SERPL-CCNC: 17 U/L — SIGNIFICANT CHANGE UP (ref 15–37)
BASOPHILS # BLD AUTO: 0.03 K/UL — SIGNIFICANT CHANGE UP (ref 0–0.2)
BASOPHILS NFR BLD AUTO: 0.4 % — SIGNIFICANT CHANGE UP (ref 0–2)
BILIRUB SERPL-MCNC: 1.4 MG/DL — HIGH (ref 0.2–1.2)
BUN SERPL-MCNC: 14 MG/DL — SIGNIFICANT CHANGE UP (ref 7–23)
CALCIUM SERPL-MCNC: 9 MG/DL — SIGNIFICANT CHANGE UP (ref 8.5–10.1)
CHLORIDE SERPL-SCNC: 107 MMOL/L — SIGNIFICANT CHANGE UP (ref 96–108)
CO2 SERPL-SCNC: 29 MMOL/L — SIGNIFICANT CHANGE UP (ref 22–31)
CREAT SERPL-MCNC: 1.16 MG/DL — SIGNIFICANT CHANGE UP (ref 0.5–1.3)
EGFR: 82 ML/MIN/1.73M2 — SIGNIFICANT CHANGE UP
EOSINOPHIL # BLD AUTO: 0.1 K/UL — SIGNIFICANT CHANGE UP (ref 0–0.5)
EOSINOPHIL NFR BLD AUTO: 1.5 % — SIGNIFICANT CHANGE UP (ref 0–6)
GLUCOSE SERPL-MCNC: 86 MG/DL — SIGNIFICANT CHANGE UP (ref 70–99)
HCT VFR BLD CALC: 48.4 % — SIGNIFICANT CHANGE UP (ref 39–50)
HGB BLD-MCNC: 15.7 G/DL — SIGNIFICANT CHANGE UP (ref 13–17)
IMM GRANULOCYTES NFR BLD AUTO: 0.1 % — SIGNIFICANT CHANGE UP (ref 0–1.5)
LYMPHOCYTES # BLD AUTO: 2.84 K/UL — SIGNIFICANT CHANGE UP (ref 1–3.3)
LYMPHOCYTES # BLD AUTO: 41.7 % — SIGNIFICANT CHANGE UP (ref 13–44)
MCHC RBC-ENTMCNC: 29.1 PG — SIGNIFICANT CHANGE UP (ref 27–34)
MCHC RBC-ENTMCNC: 32.4 GM/DL — SIGNIFICANT CHANGE UP (ref 32–36)
MCV RBC AUTO: 89.8 FL — SIGNIFICANT CHANGE UP (ref 80–100)
MONOCYTES # BLD AUTO: 0.44 K/UL — SIGNIFICANT CHANGE UP (ref 0–0.9)
MONOCYTES NFR BLD AUTO: 6.5 % — SIGNIFICANT CHANGE UP (ref 2–14)
NEUTROPHILS # BLD AUTO: 3.39 K/UL — SIGNIFICANT CHANGE UP (ref 1.8–7.4)
NEUTROPHILS NFR BLD AUTO: 49.8 % — SIGNIFICANT CHANGE UP (ref 43–77)
PLATELET # BLD AUTO: 200 K/UL — SIGNIFICANT CHANGE UP (ref 150–400)
POTASSIUM SERPL-MCNC: 4.6 MMOL/L — SIGNIFICANT CHANGE UP (ref 3.5–5.3)
POTASSIUM SERPL-SCNC: 4.6 MMOL/L — SIGNIFICANT CHANGE UP (ref 3.5–5.3)
PROT SERPL-MCNC: 7.2 GM/DL — SIGNIFICANT CHANGE UP (ref 6–8.3)
RBC # BLD: 5.39 M/UL — SIGNIFICANT CHANGE UP (ref 4.2–5.8)
RBC # FLD: 13.1 % — SIGNIFICANT CHANGE UP (ref 10.3–14.5)
SODIUM SERPL-SCNC: 137 MMOL/L — SIGNIFICANT CHANGE UP (ref 135–145)
WBC # BLD: 6.81 K/UL — SIGNIFICANT CHANGE UP (ref 3.8–10.5)
WBC # FLD AUTO: 6.81 K/UL — SIGNIFICANT CHANGE UP (ref 3.8–10.5)

## 2022-03-08 PROCEDURE — 96375 TX/PRO/DX INJ NEW DRUG ADDON: CPT

## 2022-03-08 PROCEDURE — 99284 EMERGENCY DEPT VISIT MOD MDM: CPT | Mod: 25

## 2022-03-08 PROCEDURE — 99285 EMERGENCY DEPT VISIT HI MDM: CPT

## 2022-03-08 PROCEDURE — 70450 CT HEAD/BRAIN W/O DYE: CPT | Mod: 26,MA

## 2022-03-08 PROCEDURE — 36415 COLL VENOUS BLD VENIPUNCTURE: CPT

## 2022-03-08 PROCEDURE — 70450 CT HEAD/BRAIN W/O DYE: CPT | Mod: MA

## 2022-03-08 PROCEDURE — 96374 THER/PROPH/DIAG INJ IV PUSH: CPT

## 2022-03-08 PROCEDURE — 80053 COMPREHEN METABOLIC PANEL: CPT

## 2022-03-08 PROCEDURE — 85025 COMPLETE CBC W/AUTO DIFF WBC: CPT

## 2022-03-08 RX ORDER — METOCLOPRAMIDE HCL 10 MG
10 TABLET ORAL ONCE
Refills: 0 | Status: COMPLETED | OUTPATIENT
Start: 2022-03-08 | End: 2022-03-08

## 2022-03-08 RX ORDER — DIPHENHYDRAMINE HCL 50 MG
25 CAPSULE ORAL ONCE
Refills: 0 | Status: COMPLETED | OUTPATIENT
Start: 2022-03-08 | End: 2022-03-08

## 2022-03-08 RX ORDER — KETOROLAC TROMETHAMINE 30 MG/ML
15 SYRINGE (ML) INJECTION ONCE
Refills: 0 | Status: DISCONTINUED | OUTPATIENT
Start: 2022-03-08 | End: 2022-03-08

## 2022-03-08 RX ORDER — SODIUM CHLORIDE 9 MG/ML
1000 INJECTION INTRAMUSCULAR; INTRAVENOUS; SUBCUTANEOUS ONCE
Refills: 0 | Status: COMPLETED | OUTPATIENT
Start: 2022-03-08 | End: 2022-03-08

## 2022-03-08 RX ADMIN — Medication 15 MILLIGRAM(S): at 18:47

## 2022-03-08 RX ADMIN — Medication 25 MILLIGRAM(S): at 18:48

## 2022-03-08 RX ADMIN — SODIUM CHLORIDE 2000 MILLILITER(S): 9 INJECTION INTRAMUSCULAR; INTRAVENOUS; SUBCUTANEOUS at 18:49

## 2022-03-08 RX ADMIN — Medication 10 MILLIGRAM(S): at 18:48

## 2022-03-08 NOTE — ED PROVIDER NOTE - PATIENT PORTAL LINK FT
You can access the FollowMyHealth Patient Portal offered by [facility] by registering at the following website: [email or web address]. By joining [employer]’s FollowMyHealth portal, you will also be able to view your health information using other applications (apps) compatible with our system.

## 2022-03-08 NOTE — ED ADULT NURSE REASSESSMENT NOTE - NS ED NURSE REASSESS COMMENT FT1
pt headache improved 2/10 at this time, CT head -, pt dc home as per Md Joshi, pt awaiting taxi, to follow up with PCP regarding BP.

## 2022-03-08 NOTE — ED ADULT TRIAGE NOTE - CHIEF COMPLAINT QUOTE
BIBA with c/o headache and R eye twitching x3 days. pt. hypertensive in triage. PMH HTN noncompliant with medications. denies other discomfort, no other abnormal neurological symptoms.

## 2022-03-08 NOTE — ED PROVIDER NOTE - PROGRESS NOTE DETAILS
pt feels much better. ED workup benign. ED evaluation and management discussed with the patient in detail.  Close PMD follow up encouraged.  Strict ED return instructions discussed in detail and patient given the opportunity to ask any questions about their discharge diagnosis and instructions. Patient verbalized understanding.

## 2022-03-08 NOTE — ED PROVIDER NOTE - OBJECTIVE STATEMENT
41 y/o male with PMHx of HTN presents to the ED c/o headache x3 days. Pt states headache started suddenly 3 days ago and has been constant since, located at the front of his head. Pt states he has been taking Ibuprofen/Tylenol without relief, last took Tylenol 400mg 2 hours ago. Pt also endorses feeling nauseas. Pt was seen at [facility] and told to come to ED for further evaluation given elevated blood pressure. Pt hypertensive upon arrival: 175/101. Pt with Hx of HTN, but is non-compliant with his medications. PCP at [facility]. TONY. 41 y/o male with PMHx of HTN presents to the ED c/o headache x3 days. Pt states headache started suddenly 3 days ago and has been constant since, located at the front of his head. Pt states he has been taking Ibuprofen/Tylenol without relief, last took Tylenol 400mg 2 hours ago. Pt also endorses feeling nauseas. Denies any recent trauma. Pt was seen at [facility] and told to come to ED for further evaluation given elevated blood pressure. Pt hypertensive upon arrival: 175/101. Pt with Hx of HTN, but is non-compliant with his medications. PCP at [facility]. TONY.

## 2022-03-08 NOTE — ED ADULT TRIAGE NOTE - BP NONINVASIVE SYSTOLIC (MM HG)
Mirtha Gary notified of arrival time 0630 for surgery on 11/28/18 with Dr. JAH Hays. At [facility]. Post Op appointment made, slip in mail.     175

## 2022-03-08 NOTE — ED PROVIDER NOTE - CONDITION AT DISCHARGE:
Delivery Date: 2018   Delivery Time: 1:39 AM   Delivery Type: , Low Transverse     Maternal History:   Dilip Kingsley is a 4 days day old 36w1d   born to a mother who is a 31 y.o.   . She has a past medical history of ADD (attention deficit disorder); Alopecia; Depression; and Mood disorder. .     Prenatal Labs Review:  ABO/Rh:   Lab Results   Component Value Date/Time    GROUPTRH B POS 2018 05:09 PM     Group B Beta Strep:   Lab Results   Component Value Date/Time    STREPBCULT No Group B Streptococcus isolated 2018 04:53 PM     HIV: 2018: HIV 1/2 Ag/Ab Negative (Ref range: Negative)  RPR:   Lab Results   Component Value Date/Time    RPR Non-reactive 2018 10:38 AM     Hepatitis B Surface Antigen:   Lab Results   Component Value Date/Time    HEPBSAG Negative 2018 10:38 AM     Rubella Immune Status:   Lab Results   Component Value Date/Time    RUBELLAIMMUN Reactive 2018 01:59 PM       Pregnancy/Delivery Course (synopsis of major diagnoses, care, treatment, and services provided during the course of the hospital stay):    The pregnancy was complicated by IUGR and oligohydramnios.. Prenatal ultrasound revealed normal anatomy and IUGR at the 6th percentile. Prenatal care was good. Mother received amnioinfusion during labor due to fetal decelerations. Membranes ruptured on 2018 at 1930 by AROM. The delivery was complicated by late decelerations.   Apgar scores    Assessment:     1 Minute:   Skin color:     Muscle tone:     Heart rate:     Breathing:     Grimace:     Total:  8          5 Minute:   Skin color:     Muscle tone:     Heart rate:     Breathing:     Grimace:     Total:  9          10 Minute:   Skin color:     Muscle tone:     Heart rate:     Breathing:     Grimace:     Total:           Living Status:       .    Review of Systems  Objective:     Admission GA: 36w1d   Admission Weight: 1970 g (4 lb 5.5 oz) (Filed from Delivery  "Summary)  Admission      Admission Length: Height: 45.7 cm (18") (Filed from Delivery Summary)    Delivery Method: , Low Transverse       Feeding Method: Breastmilk     Labs:  Recent Results (from the past 168 hour(s))   Hematocrit    Collection Time: 18  1:39 AM   Result Value Ref Range    Hematocrit 52.0 42.0 - 63.0 %   POCT glucose    Collection Time: 18  3:53 AM   Result Value Ref Range    POCT Glucose 76 70 - 110 mg/dL   POCT glucose    Collection Time: 18  7:22 AM   Result Value Ref Range    POCT Glucose 68 (L) 70 - 110 mg/dL   POCT glucose    Collection Time: 18 10:45 AM   Result Value Ref Range    POCT Glucose 62 (L) 70 - 110 mg/dL   POCT glucose    Collection Time: 18  1:46 PM   Result Value Ref Range    POCT Glucose 80 70 - 110 mg/dL   POCT glucose    Collection Time: 18  5:00 PM   Result Value Ref Range    POCT Glucose 51 (L) 70 - 110 mg/dL   POCT glucose    Collection Time: 18  8:04 PM   Result Value Ref Range    POCT Glucose 83 70 - 110 mg/dL   CMV DNA PCR QUAL (NON-BLOOD) Urine    Collection Time: 18 10:52 PM   Result Value Ref Range    CMV DNA Source Urine     CMV DNA DetectR (Qual), Non-Blood Not detected Not detected   POCT glucose    Collection Time: 18 11:15 PM   Result Value Ref Range    POCT Glucose 87 70 - 110 mg/dL   Bilirubin, Total,     Collection Time: 18  2:26 AM   Result Value Ref Range    Bilirubin, Total -  8.0 (H) 0.1 - 6.0 mg/dL   Bilirubin, Total,     Collection Time: 18  6:12 PM   Result Value Ref Range    Bilirubin, Total -  8.2 (H) 0.1 - 6.0 mg/dL   Bilirubin, Total,     Collection Time: 18  6:04 AM   Result Value Ref Range    Bilirubin, Total -  8.8 0.1 - 10.0 mg/dL    Bilirubin, Direct    Collection Time: 18  6:04 AM   Result Value Ref Range    Bilirubin, Direct - 0.4 0.1 - 0.6 mg/dL   POCT glucose    Collection Time: 07/15/18  " 2:31 AM   Result Value Ref Range    POCT Glucose 65 (L) 70 - 110 mg/dL       Immunization History   Administered Date(s) Administered    Hepatitis B, Pediatric/Adolescent 2018       Nursery Course (synopsis of major diagnoses, care, treatment, and services provided during the course of the hospital stay): The infant required 12 hours of phototherapy in the first 24 hours of life. His follow up bili was HI. He fed well during the hospital stay and received only breast milk. He had several episodes of hypothermia that resolved with time and increased clothing and blankets. His temp was stable during the last 24 hours of hospitalization.      Screen sent greater than 24 hours?: yes  Hearing Screen Right Ear: passed    Left Ear: passed   Stooling: Yes  Voiding: Yes  SpO2: Pre-Ductal (Right Hand): 98 %  SpO2: Post-Ductal: 98 %  Car Seat Test? Car Seat Testing Results: Pass  Therapeutic Interventions: phototherapy  Surgical Procedures: circumcision    Discharge Exam:   Discharge Weight: Weight: 1870 g (4 lb 2 oz)  Weight Change Since Birth: -5%   Up from previous wt  Physical Exam   General Appearance: thin vigorous infant,  no dysmorphic features  Head: Normocephalic, atraumatic, anterior fontanelle open soft and flat  Eyes: PERRL, red reflex present bilaterally, anicteric sclera, no discharge  Ears: Well-positioned, well-formed pinnae    Nose:  nares patent, no rhinorrhea  Throat: oropharynx clear, non-erythematous, mucous membranes moist, palate intact  Neck: Supple, symmetrical, no torticollis  Chest:  respirations unlabored, no tachypnea,lungs clear to auscultation  Heart: Regular rate & rhythm, normal S1/S2, no murmurs, rubs, or gallops   Abdomen: positive bowel sounds, soft, non-tender, non-distended, no masses, umbilical stump clean  Pulses: Strong equal femoral and brachial pulses, brisk capillary refill  Hips: Negative Villatoro & Ortolani, gluteal creases equal  : Normal Mil I male genitalia,  anus patent, testes descended  Musculosketal: no alexa or dimples, no scoliosis or masses, clavicles intact  Extremities: Well-perfused, warm and dry, no cyanosis  Skin: no rashes, no jaundice  Neuro: strong cry, good symmetric tone and strength;positive coreen and suck   Satisfactory

## 2022-03-08 NOTE — ED PROVIDER NOTE - NSFOLLOWUPINSTRUCTIONS_ED_ALL_ED_FT
Follow up with your doctor within 2 weeks for a blood pressure check.  Ibuprofen and tylenol as needed for headache pain  Anything worsens or persists, return to ER for further care and evaluation.        Acute Headache    WHAT YOU NEED TO KNOW:    An acute headache is pain or discomfort that starts suddenly and gets worse quickly. You may have an acute headache only when you feel stress or eat certain foods. Other acute headache pain can happen every day, and sometimes several times a day.     DISCHARGE INSTRUCTIONS:    Return to the emergency department if:     You have severe pain.      You have numbness or weakness on one side of your face or body.      You have a headache that occurs after a blow to the head, a fall, or other trauma.       You have a headache, are forgetful or confused, or have trouble speaking.      You have a headache, stiff neck, and a fever.    Contact your healthcare provider if:     You have a constant headache and are vomiting.      You have a headache each day that does not get better, even after treatment.      You have changes in your headaches, or new symptoms that occur when you have a headache.      You have questions or concerns about your condition or care.    Medicines: You may need any of the following:     Prescription pain medicine may be given. The medicine your healthcare provider recommends will depend on the kind of headaches you have. You will need to take prescription headache medicines as directed to prevent a problem called rebound headache. These headaches happen with regular use of pain relievers for headache disorders.      NSAIDs, such as ibuprofen, help decrease swelling, pain, and fever. This medicine is available with or without a doctor's order. NSAIDs can cause stomach bleeding or kidney problems in certain people. If you take blood thinner medicine, always ask your healthcare provider if NSAIDs are safe for you. Always read the medicine label and follow directions.      Acetaminophen decreases pain and fever. It is available without a doctor's order. Ask how much to take and how often to take it. Follow directions. Read the labels of all other medicines you are using to see if they also contain acetaminophen, or ask your doctor or pharmacist. Acetaminophen can cause liver damage if not taken correctly. Do not use more than 3 grams (3,000 milligrams) total of acetaminophen in one day.       Antidepressants may be given for some kinds of headaches.       Take your medicine as directed. Contact your healthcare provider if you think your medicine is not helping or if you have side effects. Tell him or her if you are allergic to any medicine. Keep a list of the medicines, vitamins, and herbs you take. Include the amounts, and when and why you take them. Bring the list or the pill bottles to follow-up visits. Carry your medicine list with you in case of an emergency.    Manage your symptoms:     Apply heat or ice on the headache area. Use a heat or ice pack. For an ice pack, you can also put crushed ice in a plastic bag. Cover the pack or bag with a towel before you apply it to your skin. Ice and heat both help decrease pain, and heat also helps decrease muscle spasms. Apply heat for 20 to 30 minutes every 2 hours. Apply ice for 15 to 20 minutes every hour. Apply heat or ice for as long and for as many days as directed. You may alternate heat and ice.      Relax your muscles. Lie down in a comfortable position and close your eyes. Relax your muscles slowly. Start at your toes and work your way up your body.      Keep a record of your headaches. Write down when your headaches start and stop. Include your symptoms and what you were doing when the headache began. Record what you ate or drank for 24 hours before the headache started. Describe the pain and where it hurts. Keep track of what you did to treat your headache and if it worked.     Prevent an acute headache:     Avoid anything that triggers an acute headache. Examples include exposure to chemicals, going to high altitude, or not getting enough sleep. Create a regular sleep routine. Go to sleep at the same time and wake up at the same time each day. Do not use electronic devices before bedtime. These may trigger a headache or prevent you from sleeping well.      Do not smoke. Nicotine and other chemicals in cigarettes and cigars can trigger an acute headache or make it worse. Ask your healthcare provider for information if you currently smoke and need help to quit. E-cigarettes or smokeless tobacco still contain nicotine. Talk to your healthcare provider before you use these products.       Limit alcohol as directed. Alcohol can trigger an acute headache or make it worse. If you have cluster headaches, do not drink alcohol during an episode. For other types of headaches, ask your healthcare provider if it is safe for you to drink alcohol. Ask how much is safe for you to drink, and how often.      Exercise as directed. Exercise can reduce tension and help with headache pain. Aim for 30 minutes of physical activity on most days of the week. Your healthcare provider can help you create an exercise plan.      Eat a variety of healthy foods. Healthy foods include fruits, vegetables, low-fat dairy products, lean meats, fish, whole grains, and cooked beans. Your healthcare provider or dietitian can help you create meals plans if you need to avoid foods that trigger headaches.    Follow up with your healthcare provider as directed: Bring your headache record with you when you see your healthcare provider. Write down your questions so you remember to ask them during your visits.

## 2022-03-08 NOTE — ED ADULT TRIAGE NOTE - HEIGHT IN FEET
Small frequent meals.    Stop Phenergan    Stop Reglan with the first sign of facial twitches      
6

## 2022-06-27 NOTE — ED STATDOCS - DISPOSITION TYPE
History  Chief Complaint   Patient presents with    Knee Pain     Angela Bai going down the stairs and landed on left knee, takes asprin daily, didn't hit head just fell on knee       46YEAR-OLD FEMALE    MODE OF ARRIVAL:   PRIVATE VEHICLE, AMBULATORY      CHIEF COMPLAINT:    Left Knee and calf pain after fall last night  Fell onto both knees  On left knee and calf are painful    NO other complaints  No other injuries       NO WEAKNESS OR DIZZINESS OR LIGHTHEADEDNESS      NEUROVASCULARLY INTACT:  NO WEAKNESS OR NUMBNESS      No cp or sob  No abdominal pain   No back pain   No deformities        History provided by:  Patient  Knee Pain  Location:  Knee  Injury: yes    Knee location:  L knee  Pain details:     Quality:  Aching    Radiates to:  Does not radiate    Severity:  Moderate    Onset quality:  Sudden  Chronicity:  New  Prior injury to area:  No  Relieved by:  Rest  Worsened by:  Bearing weight  Associated symptoms: decreased ROM (due to pain )    Associated symptoms: no back pain, no fatigue, no fever, no muscle weakness, no neck pain, no numbness, no stiffness and no swelling        Prior to Admission Medications   Prescriptions Last Dose Informant Patient Reported? Taking?    Synthroid 200 MCG tablet   No No   Sig: TAKE 1 TABLET DAILY, ADD [id number] FOR TOTAL OF 225MCG  DAILY   albuterol (ProAir HFA) 90 mcg/act inhaler   No No   Sig: Inhale 2 puffs every 6 (six) hours as needed for wheezing   escitalopram (LEXAPRO) 20 mg tablet   No No   Sig: Take 1 tablet (20 mg total) by mouth daily   fluticasone (FLONASE) 50 mcg/act nasal spray   No No   Si sprays into each nostril daily   levocetirizine (XYZAL) 5 MG tablet   No No   Sig: Take 1 tablet (5 mg total) by mouth every evening   lisinopril (ZESTRIL) 20 mg tablet   No No   Sig: Take 1 tablet (20 mg total) by mouth daily   montelukast (SINGULAIR) 10 mg tablet   No No   Sig: Take 1 tablet (10 mg total) by mouth daily at bedtime   mupirocin (BACTROBAN) 2 % ointment No No   Sig: Put into both nostrils at bedtime with a Qtip x 3 months   omeprazole (PriLOSEC) 20 mg delayed release capsule   No No   Sig: Take 1 capsule (20 mg total) by mouth daily before breakfast      Facility-Administered Medications: None       Past Medical History:   Diagnosis Date    Acute sinus infection     Anxiety     Asthma     Depression     Disease of thyroid gland     GERD (gastroesophageal reflux disease)     Heel spur     right    Hypertension     MRSA (methicillin resistant Staphylococcus aureus)     right leg & right leg inner thigh    Neck pain     Obesity     Sleep disturbance        Past Surgical History:   Procedure Laterality Date    BREAST BIOPSY Right     benign     SECTION      x1 live birth   [facility]  SECTION      LEG SURGERY Right 2015    removal of MRSA    THYROIDECTOMY  2015       Family History   Problem Relation Age of Onset    Cervical cancer Mother     Hypertension Mother     Hypertension Father     Heart disease Maternal Uncle     Breast cancer Paternal Grandmother     No Known Problems Sister     No Known Problems Daughter     No Known Problems Maternal Grandmother     No Known Problems Maternal Grandfather     No Known Problems Paternal Grandfather     No Known Problems Sister     No Known Problems Daughter     No Known Problems Paternal Aunt     No Known Problems Paternal Aunt     No Known Problems Paternal Aunt     No Known Problems Paternal Aunt     No Known Problems Paternal Aunt     No Known Problems Paternal Aunt     No Known Problems Paternal Aunt      I have reviewed and agree with the history as documented      E-Cigarette/Vaping    E-Cigarette Use Never User      E-Cigarette/Vaping Substances    Nicotine No     THC No     CBD No     Flavoring No     Other No     Unknown No      Social History     Tobacco Use    Smoking status: Never Smoker    Smokeless tobacco: Never Used   Vaping Use    Vaping Use: Never used Substance Use Topics    Alcohol use: Not Currently     Comment: socially    Drug use: No       Review of Systems   Constitutional: Negative for chills, diaphoresis, fatigue and fever  Respiratory: Negative for cough, shortness of breath, wheezing and stridor  Cardiovascular: Negative for chest pain, palpitations and leg swelling  Gastrointestinal: Negative for abdominal pain, blood in stool, nausea and vomiting  Genitourinary: Negative for difficulty urinating, dysuria, flank pain and frequency  Musculoskeletal: Negative for arthralgias, back pain, gait problem, joint swelling, myalgias, neck pain, neck stiffness and stiffness  Left knee pain after fall yesterday    Skin: Negative for rash and wound  Neurological: Negative for dizziness, light-headedness and headaches  All other systems reviewed and are negative  Physical Exam  Physical Exam  Constitutional:       General: She is not in acute distress  Appearance: She is well-developed  She is not ill-appearing, toxic-appearing or diaphoretic  HENT:      Head: Normocephalic and atraumatic  Right Ear: External ear normal       Left Ear: External ear normal       Nose: Nose normal    Eyes:      General: No scleral icterus  Right eye: No discharge  Left eye: No discharge  Extraocular Movements: Extraocular movements intact  Conjunctiva/sclera: Conjunctivae normal       Pupils: Pupils are equal, round, and reactive to light  Neck:      Vascular: No JVD  Trachea: No tracheal deviation  Cardiovascular:      Rate and Rhythm: Normal rate and regular rhythm  Pulses: Normal pulses  Heart sounds: Normal heart sounds  No murmur heard  No friction rub  No gallop  Pulmonary:      Effort: Pulmonary effort is normal  No respiratory distress  Breath sounds: Normal breath sounds  No stridor  No wheezing, rhonchi or rales  Chest:      Chest wall: No tenderness     Abdominal:      General: Bowel sounds are normal  There is no distension  Palpations: Abdomen is soft  There is no mass  Tenderness: There is no abdominal tenderness  There is no right CVA tenderness, left CVA tenderness, guarding or rebound  Hernia: No hernia is present  Musculoskeletal:         General: Tenderness (diffuse, left knee  ttp of the left calf  compartments soft  NVI) present  No swelling, deformity or signs of injury  Normal range of motion  Cervical back: Normal range of motion and neck supple  No rigidity or tenderness  Right lower leg: No edema  Left lower leg: No edema  Lymphadenopathy:      Cervical: No cervical adenopathy  Skin:     General: Skin is warm  Capillary Refill: Capillary refill takes less than 2 seconds  Coloration: Skin is not jaundiced or pale  Findings: No bruising, erythema, lesion or rash  Neurological:      General: No focal deficit present  Mental Status: She is alert and oriented to person, place, and time  Mental status is at baseline  Cranial Nerves: No cranial nerve deficit  Sensory: No sensory deficit  Motor: No weakness or abnormal muscle tone  Coordination: Coordination normal    Psychiatric:         Mood and Affect: Mood normal          Behavior: Behavior normal          Thought Content:  Thought content normal          Judgment: Judgment normal          Vital Signs  ED Triage Vitals [06/27/22 0535]   Temperature Pulse Respirations Blood Pressure SpO2   98 4 °F (36 9 °C) 76 20 160/70 98 %      Temp Source Heart Rate Source Patient Position - Orthostatic VS BP Location FiO2 (%)   Oral Monitor Sitting Right arm --      Pain Score       7           Vitals:    06/27/22 0535   BP: 160/70   Pulse: 76   Patient Position - Orthostatic VS: Sitting         Visual Acuity      ED Medications  Medications   HYDROcodone-acetaminophen (NORCO) 5-325 mg per tablet 1 tablet (1 tablet Oral Given 6/27/22 0601)       Diagnostic Studies  Results Reviewed     None                 XR tibia fibula 2 views LEFT    (Results Pending)   XR knee 4+ vw left injury    (Results Pending)              Procedures  Procedures         ED Course  ED Course as of 06/27/22 0609   Mon Jun 27, 2022   0555 Patient seen and evaluated  She is here for left leg, left knee pain after fall yesterday  Positive Homans sign on exam                                 SBIRT 20yo+    Flowsheet Row Most Recent Value   SBIRT (23 yo +)    In order to provide better care to our patients, we are screening all of our patients for alcohol and drug use  Would it be okay to ask you these screening questions? Yes Filed at: 06/27/2022 0015   Initial Alcohol Screen: US AUDIT-C     1  How often do you have a drink containing alcohol? 1 Filed at: 06/27/2022 0539   2  How many drinks containing alcohol do you have on a typical day you are drinking? 2 Filed at: 06/27/2022 0539   3a  Male UNDER 65: How often do you have five or more drinks on one occasion? 0 Filed at: 06/27/2022 0539   3b  FEMALE Any Age, or MALE 65+: How often do you have 4 or more drinks on one occassion? 0 Filed at: 06/27/2022 0539   Audit-C Score 3 Filed at: 06/27/2022 0362   TRU: How many times in the past year have you    Used an illegal drug or used a prescription medication for non-medical reasons? Never Filed at: 06/27/2022 2440                    MDM    Disposition  Final diagnoses:   None     ED Disposition     None      Follow-up Information    None         Patient's Medications   Discharge Prescriptions    No medications on file       No discharge procedures on file      PDMP Review     None          ED Provider  Electronically Signed by           Diana Ronquillo MD  06/27/22 1841 DISCHARGE

## 2023-03-03 ENCOUNTER — EMERGENCY (EMERGENCY)
Facility: HOSPITAL | Age: 43
LOS: 0 days | Discharge: HOME | End: 2023-03-03
Attending: EMERGENCY MEDICINE
Payer: COMMERCIAL

## 2023-03-03 VITALS
OXYGEN SATURATION: 100 % | RESPIRATION RATE: 19 BRPM | HEART RATE: 87 BPM | DIASTOLIC BLOOD PRESSURE: 82 MMHG | HEIGHT: 71 IN | SYSTOLIC BLOOD PRESSURE: 162 MMHG | WEIGHT: 175.05 LBS

## 2023-03-03 DIAGNOSIS — K08.89 OTHER SPECIFIED DISORDERS OF TEETH AND SUPPORTING STRUCTURES: ICD-10-CM

## 2023-03-03 PROCEDURE — 99283 EMERGENCY DEPT VISIT LOW MDM: CPT

## 2023-03-03 PROCEDURE — 99284 EMERGENCY DEPT VISIT MOD MDM: CPT

## 2023-03-03 RX ADMIN — Medication 1 TABLET(S): at 06:54

## 2023-03-03 NOTE — ED PROVIDER NOTE - OBJECTIVE STATEMENT
43-year-old male without significant past medical history presents with right-sided dental pain x2 days.  Taking ibuprofen and Aleve without relief.  No fevers or chills.

## 2023-03-03 NOTE — ED PROVIDER NOTE - PATIENT PORTAL LINK FT
You can access the FollowMyHealth Patient Portal offered by [facility] by registering at the following website: [email or web address]. By joining [facility]’s FollowMyHealth portal, you will also be able to view your health information using other applications (apps) compatible with our system.

## 2023-03-03 NOTE — ED PROVIDER NOTE - NSFOLLOWUPINSTRUCTIONS_ED_ALL_ED_FT
Please take antibiotics as prescribed. Please see a dentist ASAP. Return to ED if fevers or other concerning symptoms.

## 2023-03-03 NOTE — ED PROVIDER NOTE - NS ED ROS FT
Gen: No fever, normal appetite  Eyes: No eye irritation or discharge  ENT: dental pain   Resp: No cough or trouble breathing  Cardiovascular: No chest pain or palpitation  Gastroenteric: No nausea/vomiting, diarrhea, constipation  :  No change in urine output; no dysuria  MS: No joint or muscle pain  Skin: No rashes  Neuro: No headache; no abnormal movements  Remainder negative, except as per the HPI

## 2023-03-03 NOTE — ED ADULT TRIAGE NOTE - CHIEF COMPLAINT QUOTE
patient complains of severe right side tooth pain to upper and lower molars worsening over past 2 days. reports pain to gums that radiates to right side jaw

## 2023-03-03 NOTE — ED ADULT NURSE NOTE - OBJECTIVE STATEMENT
Pt came into the ED with  complains of severe R side tooth pain to upper and lower molars worsening over past 2 days. reports pain to gums that radiates to right side jaw. Pt didn't take any medication PTA. Pt given PO meds for pain and abx.

## 2023-03-03 NOTE — ED PROVIDER NOTE - PHYSICAL EXAMINATION
Gen: Well appearing in NAD  Head: NC/AT  ENT: poor dentition, tenderness right lower gumline   Neck: trachea midline  Resp:  No distress  Ext: no deformities  Neuro:  A&O appears non focal  Skin:  Warm and dry as visualized  Psych:  Normal affect and mood

## 2023-03-04 ENCOUNTER — EMERGENCY (EMERGENCY)
Facility: HOSPITAL | Age: 43
LOS: 0 days | Discharge: HOME | End: 2023-03-05
Attending: EMERGENCY MEDICINE
Payer: COMMERCIAL

## 2023-03-04 VITALS — WEIGHT: 162.92 LBS | HEIGHT: 72 IN

## 2023-03-04 DIAGNOSIS — I10 ESSENTIAL (PRIMARY) HYPERTENSION: ICD-10-CM

## 2023-03-04 DIAGNOSIS — K02.9 DENTAL CARIES, UNSPECIFIED: ICD-10-CM

## 2023-03-04 DIAGNOSIS — R50.9 FEVER, UNSPECIFIED: ICD-10-CM

## 2023-03-04 DIAGNOSIS — K04.7 PERIAPICAL ABSCESS WITHOUT SINUS: ICD-10-CM

## 2023-03-04 DIAGNOSIS — R22.0 LOCALIZED SWELLING, MASS AND LUMP, HEAD: ICD-10-CM

## 2023-03-04 PROCEDURE — 87040 BLOOD CULTURE FOR BACTERIA: CPT

## 2023-03-04 PROCEDURE — 85025 COMPLETE CBC W/AUTO DIFF WBC: CPT

## 2023-03-04 PROCEDURE — 83605 ASSAY OF LACTIC ACID: CPT

## 2023-03-04 PROCEDURE — 36415 COLL VENOUS BLD VENIPUNCTURE: CPT

## 2023-03-04 PROCEDURE — 96375 TX/PRO/DX INJ NEW DRUG ADDON: CPT

## 2023-03-04 PROCEDURE — 96374 THER/PROPH/DIAG INJ IV PUSH: CPT

## 2023-03-04 PROCEDURE — 70487 CT MAXILLOFACIAL W/DYE: CPT | Mod: MA

## 2023-03-04 PROCEDURE — 99284 EMERGENCY DEPT VISIT MOD MDM: CPT | Mod: 25

## 2023-03-04 PROCEDURE — 99284 EMERGENCY DEPT VISIT MOD MDM: CPT

## 2023-03-04 PROCEDURE — 80053 COMPREHEN METABOLIC PANEL: CPT

## 2023-03-04 NOTE — ED ADULT TRIAGE NOTE - CHIEF COMPLAINT QUOTE
pt comes in to ED ambulatory from home with abcess of 2 days that popped in mouth x 30 min ago. denies N/V/D, chest pain, SOB. pt reports taking abx for abcess.

## 2023-03-05 VITALS
OXYGEN SATURATION: 100 % | HEART RATE: 80 BPM | RESPIRATION RATE: 18 BRPM | TEMPERATURE: 98 F | SYSTOLIC BLOOD PRESSURE: 125 MMHG | DIASTOLIC BLOOD PRESSURE: 78 MMHG

## 2023-03-05 LAB
ALBUMIN SERPL ELPH-MCNC: 3.5 G/DL — SIGNIFICANT CHANGE UP (ref 3.3–5)
ALP SERPL-CCNC: 68 U/L — SIGNIFICANT CHANGE UP (ref 40–120)
ALT FLD-CCNC: 30 U/L — SIGNIFICANT CHANGE UP (ref 12–78)
ANION GAP SERPL CALC-SCNC: 5 MMOL/L — SIGNIFICANT CHANGE UP (ref 5–17)
AST SERPL-CCNC: 20 U/L — SIGNIFICANT CHANGE UP (ref 15–37)
BASOPHILS # BLD AUTO: 0 K/UL — SIGNIFICANT CHANGE UP (ref 0–0.2)
BASOPHILS NFR BLD AUTO: 0 % — SIGNIFICANT CHANGE UP (ref 0–2)
BILIRUB SERPL-MCNC: 0.5 MG/DL — SIGNIFICANT CHANGE UP (ref 0.2–1.2)
BUN SERPL-MCNC: 15 MG/DL — SIGNIFICANT CHANGE UP (ref 7–23)
CALCIUM SERPL-MCNC: 9 MG/DL — SIGNIFICANT CHANGE UP (ref 8.5–10.1)
CHLORIDE SERPL-SCNC: 108 MMOL/L — SIGNIFICANT CHANGE UP (ref 96–108)
CO2 SERPL-SCNC: 26 MMOL/L — SIGNIFICANT CHANGE UP (ref 22–31)
CREAT SERPL-MCNC: 0.97 MG/DL — SIGNIFICANT CHANGE UP (ref 0.5–1.3)
EGFR: 101 ML/MIN/1.73M2 — SIGNIFICANT CHANGE UP
EOSINOPHIL # BLD AUTO: 0 K/UL — SIGNIFICANT CHANGE UP (ref 0–0.5)
EOSINOPHIL NFR BLD AUTO: 0 % — SIGNIFICANT CHANGE UP (ref 0–6)
GLUCOSE SERPL-MCNC: 117 MG/DL — HIGH (ref 70–99)
HCT VFR BLD CALC: 44.2 % — SIGNIFICANT CHANGE UP (ref 39–50)
HGB BLD-MCNC: 15.1 G/DL — SIGNIFICANT CHANGE UP (ref 13–17)
LACTATE SERPL-SCNC: 0.9 MMOL/L — SIGNIFICANT CHANGE UP (ref 0.7–2)
LYMPHOCYTES # BLD AUTO: 1.98 K/UL — SIGNIFICANT CHANGE UP (ref 1–3.3)
LYMPHOCYTES # BLD AUTO: 37 % — SIGNIFICANT CHANGE UP (ref 13–44)
MANUAL SMEAR VERIFICATION: SIGNIFICANT CHANGE UP
MCHC RBC-ENTMCNC: 29.6 PG — SIGNIFICANT CHANGE UP (ref 27–34)
MCHC RBC-ENTMCNC: 34.2 GM/DL — SIGNIFICANT CHANGE UP (ref 32–36)
MCV RBC AUTO: 86.7 FL — SIGNIFICANT CHANGE UP (ref 80–100)
MONOCYTES # BLD AUTO: 0.96 K/UL — HIGH (ref 0–0.9)
MONOCYTES NFR BLD AUTO: 18 % — HIGH (ref 2–14)
NEUTROPHILS # BLD AUTO: 2.41 K/UL — SIGNIFICANT CHANGE UP (ref 1.8–7.4)
NEUTROPHILS NFR BLD AUTO: 44 % — SIGNIFICANT CHANGE UP (ref 43–77)
NEUTS BAND # BLD: 1 % — SIGNIFICANT CHANGE UP (ref 0–8)
NRBC # BLD: 0 /100 — SIGNIFICANT CHANGE UP (ref 0–0)
NRBC # BLD: SIGNIFICANT CHANGE UP /100 WBCS (ref 0–0)
PLAT MORPH BLD: NORMAL — SIGNIFICANT CHANGE UP
PLATELET # BLD AUTO: 168 K/UL — SIGNIFICANT CHANGE UP (ref 150–400)
POTASSIUM SERPL-MCNC: 3.8 MMOL/L — SIGNIFICANT CHANGE UP (ref 3.5–5.3)
POTASSIUM SERPL-SCNC: 3.8 MMOL/L — SIGNIFICANT CHANGE UP (ref 3.5–5.3)
PROT SERPL-MCNC: 7.4 GM/DL — SIGNIFICANT CHANGE UP (ref 6–8.3)
RBC # BLD: 5.1 M/UL — SIGNIFICANT CHANGE UP (ref 4.2–5.8)
RBC # FLD: 12.7 % — SIGNIFICANT CHANGE UP (ref 10.3–14.5)
RBC BLD AUTO: NORMAL — SIGNIFICANT CHANGE UP
SODIUM SERPL-SCNC: 139 MMOL/L — SIGNIFICANT CHANGE UP (ref 135–145)
WBC # BLD: 5.35 K/UL — SIGNIFICANT CHANGE UP (ref 3.8–10.5)
WBC # FLD AUTO: 5.35 K/UL — SIGNIFICANT CHANGE UP (ref 3.8–10.5)

## 2023-03-05 PROCEDURE — 70487 CT MAXILLOFACIAL W/DYE: CPT | Mod: 26,MA

## 2023-03-05 RX ORDER — ACETAMINOPHEN 500 MG
1000 TABLET ORAL ONCE
Refills: 0 | Status: COMPLETED | OUTPATIENT
Start: 2023-03-05 | End: 2023-03-05

## 2023-03-05 RX ORDER — SODIUM CHLORIDE 9 MG/ML
1000 INJECTION INTRAMUSCULAR; INTRAVENOUS; SUBCUTANEOUS ONCE
Refills: 0 | Status: COMPLETED | OUTPATIENT
Start: 2023-03-05 | End: 2023-03-05

## 2023-03-05 RX ADMIN — Medication 400 MILLIGRAM(S): at 01:45

## 2023-03-05 RX ADMIN — Medication 100 MILLIGRAM(S): at 01:15

## 2023-03-05 RX ADMIN — SODIUM CHLORIDE 2000 MILLILITER(S): 9 INJECTION INTRAMUSCULAR; INTRAVENOUS; SUBCUTANEOUS at 01:00

## 2023-03-05 NOTE — ED PROVIDER NOTE - PATIENT PORTAL LINK FT
You can access the FollowMyHealth Patient Portal offered by [facility] by registering at the following website: [email or web address]. By joining [facility]’s FollowMyHealth portal, you will also be able to view your health information using other applications (apps) compatible with our system.

## 2023-03-05 NOTE — ED PROVIDER NOTE - PHYSICAL EXAMINATION
Gen:  ill appearing with right sided facial swelling  Head:  NC/AT  HEENT: pupils perrl,no pharyngeal erythema, uvula midline,  severe dental caries throughout  mouth, right upper gumb with swelling, fluctuance drainaing bad tasting discharge  Cardiac: S1S2, RRR  Abd: Soft, non tender  Resp: No distress, CTA   musculoskeletal:: no deformities, no swelling, strength +5/+5  Skin: warm and dry as visualized, no rashes  Neuro: ANDREWS, aao x 4  Psych:alert, cooperative, appropriate mood and affect for situation

## 2023-03-05 NOTE — ED PROVIDER NOTE - OBJECTIVE STATEMENT
42 yo male who had mouth pain 2 days ago and diagnosed with abscess, is on augmentin and tonight had increased swelling of right side of face and "bad taste" in  his mouth . subjective fever and chills.

## 2023-03-05 NOTE — ED ADULT NURSE NOTE - OBJECTIVE STATEMENT
Pt is a 43y male ambulatory to ED c/o abscess rupture. Pt went to  ED yesterday for abscess, started on antibiotics. Developed swelling and facial abscess rupture today. Pt endorses facial pain 6/10, denies N/V/D/fever/chills.

## 2023-03-05 NOTE — ED PROVIDER NOTE - NSFOLLOWUPINSTRUCTIONS_ED_ALL_ED_FT
tylenol 2 tabs every 4 hours for pain and or motrin 600mg every 6 hours  return for increased facial swelling or fever  FOLLOW UP WITH A DENTIST THIS WEEK  CALL A DENTIST OR DENTAL CLINIC  CONTINUE TAKING YOUR ANTIBIOTICS    Dental Abscess    Cross-sectional view of two teeth: one tooth is normal and the other tooth has an abscess.   A dental abscess is an area of pus in or around a tooth. It comes from an infection. It can cause pain and other symptoms. Treatment will help with symptoms and prevent the infection from spreading.      What are the causes?    This condition is caused by an infection in or around the tooth. This can be from:  •Very bad tooth decay (cavities).      •A bad injury to the tooth, such as a broken or chipped tooth.        What increases the risk?    The risk to get an abscess is higher in males. It is also more likely in people who:  •Have dental decay.      •Have very bad gum disease.      •Eat sugary snacks between meals.      •Use tobacco.      •Have diabetes.      •Have a weak disease-fighting system (immune system).      •Do not brush their teeth regularly.        What are the signs or symptoms?    Some mild symptoms are:  •Tenderness.      •Bad breath.      •Fever.      •A sharp, sour taste in the mouth.      •Pain in and around the infected tooth.      Worse symptoms of this condition include:  •Swollen neck glands.      •Chills.      •Pus draining around the tooth.      •Swelling and redness around the tooth, the mouth, or the face.      •Very bad pain in and around the tooth.      The worst symptoms can include:  •Difficulty swallowing.      •Difficulty opening your mouth.      •Feeling like you may vomit or vomiting.        How is this treated?    This is treated by getting rid of the infection. Your dentist will discuss ways to do this, including:  •Antibiotic medicines.      •Antibacterial mouth rinse.      •An incision in the abscess to drain out the pus.      •A root canal.      •Removing the tooth.        Follow these instructions at home:    Medicines     •Take over-the-counter and prescription medicines only as told by your dentist.      •If you were prescribed an antibiotic medicine, take it as told by your dentist. Do not stop taking it even if you start to feel better.      •If you were prescribed a gel that has numbing medicine in it, use it exactly as told.      •Ask your dentist if you should avoid driving or using machines while you are taking your medicine.      General instructions     •Rinse your mouth often with salt water. To make salt water, dissolve ½–1 tsp (3–6 g) of salt in 1 cup (237 mL) of warm water.      •Eat a soft diet while your mouth is healing.      •Drink enough fluid to keep your pee (urine) pale yellow.      • Do not apply heat to the outside of your mouth.      • Do not smoke or use any products that contain nicotine or tobacco. If you need help quitting, ask your dentist.      •Keep all follow-up visits.      Prevent an abscess     •Brush your teeth every morning and every night. Use fluoride toothpaste.      •Floss your teeth each day.      •Get dental cleanings as often as told by your dentist.      •Think about getting dental sealant put on teeth that have deep holes (decay).    •Drink water that has fluoride in it.  •Most tap water has fluoride.      •Check the label on bottled water to see if it has fluoride in it.        •Drink water instead of sugary drinks.       •Eat healthy meals and snacks.       •Wear a mouth guard or face shield when you play sports.        Contact a doctor if:    •Your pain is worse and medicine does not help.        Get help right away if:    •You have a fever or chills.      •Your symptoms suddenly get worse.      •You have a very bad headache.      •You have problems breathing or swallowing.      •You have trouble opening your mouth.      •You have swelling in your neck or close to your eye.      These symptoms may be an emergency. Get help right away. Call your local emergency services (911 in the [place]   • Do not wait to see if the symptoms will go away.       • Do not drive yourself to the hospital.         Summary    •A dental abscess is an area of pus in or around a tooth. It is caused by an infection.      •Treatment will help with symptoms and prevent the infection from spreading.      •Take over-the-counter and prescription medicines only as told by your dentist.      •To prevent an abscess, take good care of your teeth. Brush your teeth every morning and night. Use floss every day.       •Get dental cleanings as often as told by your dentist.      This information is not intended to replace advice given to you by your health care provider. Make sure you discuss any questions you have with your health care provider.      Document Revised: 02/24/2022 Document Reviewed: 02/24/2022    Elsevier Patient Education © 2023 [employer]

## 2023-03-05 NOTE — ED PROVIDER NOTE - NSFOLLOWUPCLINICS_GEN_ALL_ED_FT
Oral & Maxillofacial Surgery  Department of Dental Medicine  [phone] [place] [address]  Phone: [phone]  Fax: [phone]

## 2023-03-05 NOTE — ED PROVIDER NOTE - CLINICAL SUMMARY MEDICAL DECISION MAKING FREE TEXT BOX
pt with right upper gumb abscess drainaing with right sided facial swelling with subjective fever and chills, will give iv clindamycin, get ct face with iv contrast to r/o extension of infection r/o drainable collection and pain meds

## 2023-03-10 LAB
CULTURE RESULTS: SIGNIFICANT CHANGE UP
CULTURE RESULTS: SIGNIFICANT CHANGE UP
SPECIMEN SOURCE: SIGNIFICANT CHANGE UP
SPECIMEN SOURCE: SIGNIFICANT CHANGE UP

## 2024-01-01 NOTE — ED ADULT TRIAGE NOTE - WEIGHT IN KG
[Passed] : passed [Normal Development] : Normal Development [Calms when picked up or spoken to] : calms when picked up or spoken to [None] : none [Looks briefly at objects] : looks briefly at objects [Holds chin up in prone] : holds chin up in prone [Alerts to unexpected sound] : alerts to unexpected sound [Holds fingers more open at rest] : holds fingers more open at rest 72.6

## 2024-03-20 ENCOUNTER — EMERGENCY (EMERGENCY)
Facility: HOSPITAL | Age: 44
LOS: 0 days | Discharge: HOME | End: 2024-03-20
Attending: EMERGENCY MEDICINE
Payer: COMMERCIAL

## 2024-03-20 VITALS
OXYGEN SATURATION: 100 % | SYSTOLIC BLOOD PRESSURE: 124 MMHG | RESPIRATION RATE: 16 BRPM | HEART RATE: 52 BPM | DIASTOLIC BLOOD PRESSURE: 74 MMHG

## 2024-03-20 VITALS — HEIGHT: 71 IN | WEIGHT: 164.91 LBS

## 2024-03-20 DIAGNOSIS — R07.89 OTHER CHEST PAIN: ICD-10-CM

## 2024-03-20 DIAGNOSIS — E78.5 HYPERLIPIDEMIA, UNSPECIFIED: ICD-10-CM

## 2024-03-20 DIAGNOSIS — J02.9 ACUTE PHARYNGITIS, UNSPECIFIED: ICD-10-CM

## 2024-03-20 DIAGNOSIS — R09.81 NASAL CONGESTION: ICD-10-CM

## 2024-03-20 DIAGNOSIS — I10 ESSENTIAL (PRIMARY) HYPERTENSION: ICD-10-CM

## 2024-03-20 LAB
ALBUMIN SERPL ELPH-MCNC: 4 G/DL — SIGNIFICANT CHANGE UP (ref 3.3–5)
ALP SERPL-CCNC: 72 U/L — SIGNIFICANT CHANGE UP (ref 40–120)
ALT FLD-CCNC: 38 U/L — SIGNIFICANT CHANGE UP (ref 12–78)
ANION GAP SERPL CALC-SCNC: 3 MMOL/L — LOW (ref 5–17)
AST SERPL-CCNC: 28 U/L — SIGNIFICANT CHANGE UP (ref 15–37)
BASOPHILS # BLD AUTO: 0.02 K/UL — SIGNIFICANT CHANGE UP (ref 0–0.2)
BASOPHILS NFR BLD AUTO: 0.4 % — SIGNIFICANT CHANGE UP (ref 0–2)
BILIRUB SERPL-MCNC: 0.8 MG/DL — SIGNIFICANT CHANGE UP (ref 0.2–1.2)
BUN SERPL-MCNC: 12 MG/DL — SIGNIFICANT CHANGE UP (ref 7–23)
CALCIUM SERPL-MCNC: 8.7 MG/DL — SIGNIFICANT CHANGE UP (ref 8.5–10.1)
CHLORIDE SERPL-SCNC: 111 MMOL/L — HIGH (ref 96–108)
CO2 SERPL-SCNC: 29 MMOL/L — SIGNIFICANT CHANGE UP (ref 22–31)
CREAT SERPL-MCNC: 1.29 MG/DL — SIGNIFICANT CHANGE UP (ref 0.5–1.3)
EGFR: 71 ML/MIN/1.73M2 — SIGNIFICANT CHANGE UP
EOSINOPHIL # BLD AUTO: 0.12 K/UL — SIGNIFICANT CHANGE UP (ref 0–0.5)
EOSINOPHIL NFR BLD AUTO: 2.6 % — SIGNIFICANT CHANGE UP (ref 0–6)
GLUCOSE SERPL-MCNC: 99 MG/DL — SIGNIFICANT CHANGE UP (ref 70–99)
HCT VFR BLD CALC: 44.9 % — SIGNIFICANT CHANGE UP (ref 39–50)
HGB BLD-MCNC: 14.7 G/DL — SIGNIFICANT CHANGE UP (ref 13–17)
IMM GRANULOCYTES NFR BLD AUTO: 0.2 % — SIGNIFICANT CHANGE UP (ref 0–0.9)
LIDOCAIN IGE QN: 32 U/L — SIGNIFICANT CHANGE UP (ref 13–75)
LYMPHOCYTES # BLD AUTO: 2.35 K/UL — SIGNIFICANT CHANGE UP (ref 1–3.3)
LYMPHOCYTES # BLD AUTO: 50.9 % — HIGH (ref 13–44)
MCHC RBC-ENTMCNC: 29.7 PG — SIGNIFICANT CHANGE UP (ref 27–34)
MCHC RBC-ENTMCNC: 32.7 GM/DL — SIGNIFICANT CHANGE UP (ref 32–36)
MCV RBC AUTO: 90.7 FL — SIGNIFICANT CHANGE UP (ref 80–100)
MONOCYTES # BLD AUTO: 0.43 K/UL — SIGNIFICANT CHANGE UP (ref 0–0.9)
MONOCYTES NFR BLD AUTO: 9.3 % — SIGNIFICANT CHANGE UP (ref 2–14)
NEUTROPHILS # BLD AUTO: 1.69 K/UL — LOW (ref 1.8–7.4)
NEUTROPHILS NFR BLD AUTO: 36.6 % — LOW (ref 43–77)
PLATELET # BLD AUTO: 222 K/UL — SIGNIFICANT CHANGE UP (ref 150–400)
POTASSIUM SERPL-MCNC: 4.1 MMOL/L — SIGNIFICANT CHANGE UP (ref 3.5–5.3)
POTASSIUM SERPL-SCNC: 4.1 MMOL/L — SIGNIFICANT CHANGE UP (ref 3.5–5.3)
PROT SERPL-MCNC: 7.1 GM/DL — SIGNIFICANT CHANGE UP (ref 6–8.3)
RBC # BLD: 4.95 M/UL — SIGNIFICANT CHANGE UP (ref 4.2–5.8)
RBC # FLD: 13.1 % — SIGNIFICANT CHANGE UP (ref 10.3–14.5)
SODIUM SERPL-SCNC: 143 MMOL/L — SIGNIFICANT CHANGE UP (ref 135–145)
TROPONIN I, HIGH SENSITIVITY RESULT: 5.96 NG/L — SIGNIFICANT CHANGE UP
TROPONIN I, HIGH SENSITIVITY RESULT: 6.32 NG/L — SIGNIFICANT CHANGE UP
WBC # BLD: 4.62 K/UL — SIGNIFICANT CHANGE UP (ref 3.8–10.5)
WBC # FLD AUTO: 4.62 K/UL — SIGNIFICANT CHANGE UP (ref 3.8–10.5)

## 2024-03-20 PROCEDURE — 84484 ASSAY OF TROPONIN QUANT: CPT

## 2024-03-20 PROCEDURE — 71045 X-RAY EXAM CHEST 1 VIEW: CPT

## 2024-03-20 PROCEDURE — 93005 ELECTROCARDIOGRAM TRACING: CPT

## 2024-03-20 PROCEDURE — 93010 ELECTROCARDIOGRAM REPORT: CPT

## 2024-03-20 PROCEDURE — 80053 COMPREHEN METABOLIC PANEL: CPT

## 2024-03-20 PROCEDURE — 85025 COMPLETE CBC W/AUTO DIFF WBC: CPT

## 2024-03-20 PROCEDURE — 36415 COLL VENOUS BLD VENIPUNCTURE: CPT

## 2024-03-20 PROCEDURE — 71045 X-RAY EXAM CHEST 1 VIEW: CPT | Mod: 26

## 2024-03-20 PROCEDURE — 99285 EMERGENCY DEPT VISIT HI MDM: CPT

## 2024-03-20 PROCEDURE — 99285 EMERGENCY DEPT VISIT HI MDM: CPT | Mod: 25

## 2024-03-20 PROCEDURE — 83690 ASSAY OF LIPASE: CPT

## 2024-03-20 RX ORDER — NITROGLYCERIN 6.5 MG
0.4 CAPSULE, EXTENDED RELEASE ORAL
Refills: 0 | Status: DISCONTINUED | OUTPATIENT
Start: 2024-03-20 | End: 2024-03-20

## 2024-03-20 RX ORDER — ASPIRIN/CALCIUM CARB/MAGNESIUM 324 MG
162 TABLET ORAL ONCE
Refills: 0 | Status: COMPLETED | OUTPATIENT
Start: 2024-03-20 | End: 2024-03-20

## 2024-03-20 RX ADMIN — Medication 162 MILLIGRAM(S): at 08:33

## 2024-03-20 RX ADMIN — Medication 0.4 MILLIGRAM(S): at 08:55

## 2024-03-20 NOTE — ED PROVIDER NOTE - CARDIAC, MLM
Normal rate, regular rhythm.  Heart sounds S1, S2.  No murmurs, rubs or gallops.  Normal radial pulse

## 2024-03-20 NOTE — ED PROVIDER NOTE - CARE PROVIDER_API CALL
Zack Lynch  Cardiovascular Disease  [address] [place] [address]  Phone: [phone]  Fax: [phone]  Follow Up Time:

## 2024-03-20 NOTE — ED PROVIDER NOTE - PATIENT PORTAL LINK FT
You can access the FollowMyHealth Patient Portal offered by [facility] by registering at the following website: [email or web address]. By joining [facility]’s FollowMyHealth portal, you will also be able to view your health information using other applications (apps) compatible with our system.

## 2024-03-20 NOTE — ED PROVIDER NOTE - OBJECTIVE STATEMENT
44-year-old AA male, history of HTN, ambulatory to ED complaining of chest pain.  Patient awoke 5 to 6 AM took DayQuil for sore throat and nasal congestion.  Shortly afterwards, + L anterior sharp chest pain, non-pleuritic and non-radiating, but persisted.  Patient  reports sore throat separate from chest pain.  Patient denies SOB, abdominal pain, palpitations, diaphoresis, fever chills, cough.  Chest pain has been gradually easing down PTA was initially moderate severity currently very mild to minimal.  PCP: Sandy     Cardio: Syed 44-year-old AA male, history of HTN, ambulatory to ED complaining of chest pain.  Patient awoke ~ 5 to 6 AM, took DayQuil for sore throat and nasal congestion.  Shortly afterwards, + L anterior sharp chest pain, non-pleuritic and non-radiating, but persisted.  Patient  reports sore throat separate from chest pain.  Patient denies SOB, abdominal pain, palpitations, diaphoresis, fever chills, cough.  Chest pain has been gradually easing down, PTA was initially moderate severity currently very mild to minimal.  PCP: Sandy     Cardio: Syed

## 2024-03-20 NOTE — ED PROVIDER NOTE - CONSTITUTIONAL, MLM
normal... Well appearing thin AA M, awake, alert, oriented to person, place, time/situation and in no apparent distress.

## 2024-03-20 NOTE — ED PROVIDER NOTE - PROGRESS NOTE DETAILS
Neelam Oliver for attending Dr. Bhat   Case discussed wit DR. Fox, covering for [facility], including EKG lab results. He evaluated pt and agreed to d/c for office f/u if second trop negative and pt clinically stable.

## 2024-03-20 NOTE — ED ADULT TRIAGE NOTE - CHIEF COMPLAINT QUOTE
Complaining of CP x1 hr. States he woke up this morning not feeling well, took Dayquil at that time. CP began about 1 hour later. Self ambulated into ED with no distress noted, breathing even and non labored.

## 2024-03-20 NOTE — ED PROVIDER NOTE - MUSCULOSKELETAL, MLM
Spine appears normal, range of motion is not limited, no muscle or joint tenderness.  ANDREWS x 4, no focal extremity swelling nor tender. Spine appears normal, range of motion is not limited, no muscle or joint tenderness.  ANDREWS x 4, no focal extremity swelling nor tender.  No reproducible chest wall tenderness

## 2024-03-20 NOTE — CONSULT NOTE ADULT - SUBJECTIVE AND OBJECTIVE BOX
Patient is a 44y old  Male who presents with a chief complaint of   ________________________________  TCarmen ROBERTSON is a 44y year old Male with a past medical history of hypertension, hyperlipidemia, mild to moderate tricuspid valve regurgitation, who is known to our practice.  In 2020 he underwent full cardiac workup including cardiac CTA which showed a calcium score of 0 with no significant CAD after EKG treadmill stress test was abnormal.  Echo showed mild valve disease, with mild LVH and normal LVEF.  He now presents for evaluation of chest discomfort, described as a aching sensation in the left side of his chest, with some nasal congestion and cough.  Pain is sharp, nonpleuritic, nonreproducible on palpation.  No associate shortness of breath or palpitations.  X-ray shows no abnormalities.  Cardiac enzymes negative x 2.    PAST CARDIOVASCULAR WORKUP:     Echocardiogram: April 28, 2022-normal EF with mild LVH, mild to moderate TR, mild pulmonic regurgitation  Stress Test: May 10, 2022-abnormal nuclear stress test  CT angio coronaries: 6/7/2022-calcium score 0 without significant evidence of coronary atherosclerosis       ________________________________  Review of systems: A 10 point review of system has been performed, and is negative except for what has been mentioned in the above history of present illness.     PAST MEDICAL & SURGICAL HISTORY:  HTN (hypertension)      No significant past surgical history        FAMILY HISTORY:       SOCIAL HISTORY: The patient denies any tobacco abuse, alcohol abuse or illicit drug use.    ALLERGIES:  No Known Allergies    Home Medications:    MEDICATIONS  (STANDING):    MEDICATIONS  (PRN):  nitroglycerin     SubLingual 0.4 milliGRAM(s) SubLingual every 5 minutes PRN Chest Pain    Vital Signs Last 24 Hrs  T(C): 36.8 (20 Mar 2024 09:08), Max: 36.8 (20 Mar 2024 07:54)  T(F): 98.2 (20 Mar 2024 09:08), Max: 98.2 (20 Mar 2024 07:54)  HR: 52 (20 Mar 2024 13:26) (51 - 56)  BP: 124/74 (20 Mar 2024 13:26) (122/85 - 139/83)  BP(mean): 96 (20 Mar 2024 09:08) (93 - 101)  RR: 16 (20 Mar 2024 13:26) (16 - 18)  SpO2: 100% (20 Mar 2024 13:26) (98% - 100%)    Parameters below as of 20 Mar 2024 13:26  Patient On (Oxygen Delivery Method): room air      I&O's Summary    ________________________________  GENERAL APPEARANCE:  No acute distress  HEAD: normocephalic, atraumatic  NECK: supple, no jugular venous distention, no carotid bruit    HEART: Regular rate and rhythm, S1, S2 normal, 1/6 murmur    CHEST:  No anterior chest wall tenderness    LUNGS:  Clear to auscultation, without any wheezing, rhonchi or rales    ABDOMEN: soft, nontender, nondistended, with positive bowel sounds appreciated  EXTREMITIES: no edema.   NEURO: Alert and oriented x3  PSYC:  Normal affect  SKIN:  Dry  ________________________________   ECG: Sinus rhythm with LVH    LABS:                        14.7   4.62  )-----------( 222      ( 20 Mar 2024 08:13 )             44.9             03-20    143  |  111<H>  |  12  ----------------------------<  99  4.1   |  29  |  1.29    Ca    8.7      20 Mar 2024 08:13    TPro  7.1  /  Alb  4.0  /  TBili  0.8  /  DBili  x   /  AST  28  /  ALT  38  /  AlkPhos  72  03-20      LIVER FUNCTIONS - ( 20 Mar 2024 08:13 )  Alb: 4.0 g/dL / Pro: 7.1 gm/dL / ALK PHOS: 72 U/L / ALT: 38 U/L / AST: 28 U/L / GGT: x           Urinalysis Basic - ( 20 Mar 2024 08:13 )    Color: x / Appearance: x / SG: x / pH: x  Gluc: 99 mg/dL / Ketone: x  / Bili: x / Urobili: x   Blood: x / Protein: x / Nitrite: x   Leuk Esterase: x / RBC: x / WBC x   Sq Epi: x / Non Sq Epi: x / Bacteria: x          Urinalysis Basic - ( 20 Mar 2024 08:13 )    Color: x / Appearance: x / SG: x / pH: x  Gluc: 99 mg/dL / Ketone: x  / Bili: x / Urobili: x   Blood: x / Protein: x / Nitrite: x   Leuk Esterase: x / RBC: x / WBC x   Sq Epi: x / Non Sq Epi: x / Bacteria: x             ________________________________    RADIOLOGY & ADDITIONAL STUDIES: Chest x-ray clear on my review  ________________________________    ASSESSMENT:  Chest pain, atypical  Cough  Hypertension  Hyperlipidemia      PLAN:  In summary, this is a 44y Male with a past medical history of hypertension, hyperlipidemia, LVH and mild valve disease, seen in the ER for chest discomfort.  Symptoms atypical.  Doubt progression of CAD.  Lipids have been controlled.  Patient has been compliant medical therapy.  No evidence to suggest ACS S.  Cardiac enzymes negative x 2 and EKG shows chronic LVH.  As there is no acute findings at this time, with improved symptoms, patient can be discharged with outpatient follow-up.    Discussed with ER attending as well.      ____________________________________________  (Dragon Dictation software used). Thank you for allowing me to participate in the care of your patient. Please contact me should any questions arise.    GEORGE Fox DO, [facility]  Office: [phone]

## 2024-03-20 NOTE — ED ADULT NURSE NOTE - OBJECTIVE STATEMENT
pt c/o left chest discomfort beginning one hour ago following taking dayquil. pt endorses that he has been feeling cold symptoms for the past couple of days. no diaphoresis, no sob, pt endorses nausea. no vomiting/diarrhea/fever/chills/dizziness. pt has pmh of HTN and mitral valve regurgitation. 20G placed to left AC. labs drawn and sent. MD Bhat at bedside.

## 2024-03-20 NOTE — ED PROVIDER NOTE - CLINICAL SUMMARY MEDICAL DECISION MAKING FREE TEXT BOX
44-year-old AA male, history of HTN, ambulatory to ED complaining of chest pain.  Patient awoke 5 to 6 AM took DayQuil for sore throat and nasal congestion.  Shortly afterwards, + L anterior sharp chest pain, non-pleuritic and non-radiating, but persisted.  P/E unremarkable, pt in NAD.    Plan: EKG, CXR, labs incl. serial Troponin; ASA, prn NTG SL.  Monitor, observe, reassess, d/w pt's Cardio MD. 44-year-old AA male, history of HTN, ambulatory to ED complaining of chest pain.  Patient awoke 5 to 6 AM took DayQuil for sore throat and nasal congestion.  Shortly afterwards, + L anterior sharp chest pain, non-pleuritic and non-radiating, but persisted.  P/E unremarkable, pt in NAD.    Plan: EKG, CXR, labs incl. serial Troponin; ASA, prn NTG SL.  Monitor, observe, reassess, d/w pt's Cardio MD.    10:47, Neelam Oliver for attending Dr. Bhat   Case discussed wit DR. Fox, covering for [facility], including EKG lab results. He evaluated pt and agreed to d/c for office f/u if second trop negative and pt clinically stable.    13:05, C MD Perlita:  Labs unremarkable incl. Troponin X 2, EKG w/o change from priors.  Pt currently cp-free, feels back to usoh.  As d/w Dr. Fox, pt stable for DC home & outpt cardio f/u.  Pt informed of all study results, expressed his understanding & agrees with DC home & outpt cardio f/u. 44-year-old AA male, history of HTN, ambulatory to ED complaining of chest pain.  Patient awoke 5 to 6 AM, took DayQuil for sore throat and nasal congestion.  Shortly afterwards, + L anterior sharp chest pain, non-pleuritic and non-radiating, but persisted.  P/E unremarkable, pt in NAD.    Plan: EKG, CXR, labs incl. serial Troponin; ASA, prn NTG SL.  Monitor, observe, reassess, d/w pt's Cardio MD.    10:47, Neelam Oliver for attending Dr. Bhat   Case discussed wit DR. Fox, covering for [facility], including EKG lab results. He evaluated pt and agreed to d/c for office f/u if second trop negative and pt clinically stable.    13:05, C MD Perlita:  Labs unremarkable incl. Troponin X 2, EKG w/o change from priors.  Pt currently cp-free, feels back to usoh.  As d/w Dr. Fox, pt stable for DC home & outpt Cardio f/u.  Pt informed of all study results, expressed his understanding & agrees with DC home & outpt Cardio f/u.

## 2024-03-20 NOTE — ED PROVIDER NOTE - NSFOLLOWUPINSTRUCTIONS_ED_ALL_ED_FT
Continue your regular medications as routine.  Avoid physical overexertion.  Follow up cardiology office Dr. Lynch.    Chest Pain    Chest pain can be caused by many different conditions which may or may not be dangerous. Causes include heartburn, lung infections, heart attack, blood clot in lungs, skin infections, strain or damage to muscle, cartilage, or bones, etc. In addition to a history and physical examination, an electrocardiogram (ECG) or other lab tests may have been performed to determine the cause of your chest pain. Follow up with your primary care provider or with a cardiologist as instructed.     SEEK IMMEDIATE MEDICAL CARE IF YOU HAVE ANY OF THE FOLLOWING SYMPTOMS: worsening chest pain, coughing up blood, unexplained back/neck/jaw pain, severe abdominal pain, dizziness or lightheadedness, fainting, shortness of breath, sweaty or clammy skin, vomiting, or racing heart beat. These symptoms may represent a serious problem that is an emergency. Do not wait to see if the symptoms will go away. Get medical help right away. Call 911 and do not drive yourself to the hospital.

## 2024-03-20 NOTE — ED PROVIDER NOTE - PRIOR OUTPATIENT RADIOLOGY SUMMARY
5/2022: Abnormal nuclear stress outpatient stress test: LVEF at rest 65%, positive anteroseptal wall defect, no myocardial ischemia demonstrated.    6/2022 outpatient CTA coronary: No coronary atherosclerosis demonstrated, calcium score equals 0, possible PFO. 5/2022: Abnormal nuclear stress outpatient stress test: LVEF at rest 65%, positive anteroseptal wall defect, no myocardial ischemia demonstrated.  6/2022 outpatient CTA coronary: No coronary atherosclerosis demonstrated, calcium score equals 0, possible PFO.

## 2024-03-20 NOTE — ED PROVIDER NOTE - SKIN, MLM
Skin normal color for race, warm, dry and intact. No evidence of rash.  No diaphoresis, no tactile warmth.

## 2024-05-16 NOTE — ED ADULT NURSE NOTE - GENITOURINARY WDL
[Well Developed] : well developed [NAD] : in no acute distress [PERRL] : pupils were equal, round, reactive to light  [Moist & Pink Mucous Membranes] : moist and pink mucous membranes [CTAB] : lungs clear to auscultation bilaterally [Regular Rate and Rhythm] : regular rate and rhythm [Normal S1, S2] : normal S1 and S2 [Soft] : soft  [Normal Bowel Sounds] : normal bowel sounds Bladder non-tender and non-distended. Urine clear yellow. [No HSM] : no hepatosplenomegaly appreciated [Normal Tone] : normal tone [Well-Perfused] : well-perfused [Interactive] : interactive [icteric] : anicteric [Respiratory Distress] : no respiratory distress  [Distended] : non distended [Tender] : non tender [Edema] : no edema [Cyanosis] : no cyanosis [Rash] : no rash [Jaundice] : no jaundice

## 2024-12-09 NOTE — ED ADULT NURSE NOTE - CAS ELECT INFOMATION PROVIDED
No protocol for requested medication.    Medication:   amphetamine-dextroamphetamine XR (ADDERALL XR) 10 MG 24 hr capsule Take 2 capsules by mouth daily. 60 capsule 10/22/2024   Last filled: 10/22/2024 for 60 capsule Zero     Last office visit date: 07/16/2024 -CPE   Next visit: 01/21/2025 pap/ 6 mo     Pharmacy: [facility] - [place], [place] - [facility]    Order pended, routed to clinician for review.    
DC instructions

## 2024-12-11 NOTE — ED ADULT NURSE NOTE - NS ED NURSE LEVEL OF CONSCIOUSNESS AFFECT
The patient has been re-examined and I agree with the above assessment or I updated with my findings. Appropriate

## 2024-12-16 NOTE — ED ADULT NURSE NOTE - CAS TRG GEN SKIN CONDITION
ED to inpatient nurses report      Chief Complaint:  Chief Complaint   Patient presents with    Shortness of Breath    Cough     Present to ED from: home     MOA:     LOC: alert and orientated to name, place, date  Mobility: Requires assistance * 1  Oxygen Baseline: 3 L nasal canula     Current needs required: 3 L nasal canula      Code Status:   Prior    What abnormal results were found and what did you give/do to treat them? Covid   Any procedures or intervention occur?     Mental Status:  Level of Consciousness: Alert (0)    Psych Assessment:        Vitals:  Patient Vitals for the past 24 hrs:   BP Temp Temp src Pulse Resp SpO2 Height Weight   12/16/24 1446 117/60 -- -- 70 17 96 % -- --   12/16/24 1356 125/68 -- -- 70 17 94 % -- --   12/16/24 1227 (!) 120/58 -- -- 70 13 93 % -- --   12/16/24 1131 (!) 145/74 97.8 °F (36.6 °C) Oral 70 21 90 % 1.702 m (5' 7\") 93 kg (205 lb)        LDAs:   Peripheral IV 12/16/24 Left;Posterior Forearm (Active)   Site Assessment Clean, dry & intact 12/16/24 1447   Line Status Normal saline locked 12/16/24 1447   Phlebitis Assessment No symptoms 12/16/24 1447   Infiltration Assessment 0 12/16/24 1447   Dressing Status Clean, dry & intact 12/16/24 1447       Ambulatory Status:  Presents to emergency department  because of falls (Syncope, seizure, or loss of consciousness): No, Age > 70: Yes, Altered Mental Status, Intoxication with alcohol or substance confusion (Disorientation, impaired judgment, poor safety awaremess, or inability to follow instructions): No, Impaired Mobility: Ambulates or transfers with assistive devices or assistance; Unable to ambulate or transer.: No, Nursing Judgement: No    Diagnosis:  DISPOSITION Admitted 12/16/2024 03:17:41 PM   Final diagnoses:   COVID-19        Consults:  None     Pain Score:  Pain Assessment  Pain Assessment: None - Denies Pain    C-SSRS:        Sepsis Screening:       Teressa Fall Risk:  [place] 1 Fall Risk  Presents to emergency department  Warm/Dry

## 2025-03-03 NOTE — ED PROVIDER NOTE - SCRIBE NAME
Detail Level: Generalized Additional Notes: Discussed possibility of lesion having a cyst forming underneath Render Risk Assessment In Note?: no Additional Notes: Discussed that the erythema will likely need to be treated with laser therapy. Patient states she improved on doxycycline at last visit, educated patient that I prescribed the doxycycline to help with any inflammatory lesions that she had at the last visit. Discussed that we can prescribe it today, but it likely will not help with the erythema. Summer Oliver

## 2025-05-01 NOTE — ED ADULT NURSE NOTE - IN THE PAST 12 MONTHS HAVE YOU USED DRUGS OTHER THAN THOSE REQUIRED FOR MEDICAL REASON?
[facility]  [facility]  [address]  [facility] [id number] [phone]     Patient: Val Hager   YOB: 1945   Date: 5/1/2025     Chief Complaint   Patient presents with    Post-Op Check     Right TKR 3/19/2025        History of Present Illness  This is a 80 y.o. female presents today 6 weeks status post right knee replacement overall doing very well she is very pleased.    History of Present Illness  The patient presents for evaluation status post knee surgery.    A satisfactory recovery following knee surgery is reported, with contentment expressed regarding the outcome. Physical therapy has been completed, and a cane is currently being used for mobility, particularly on uneven surfaces such as rocks or gravel. An inquiry about the possibility of resuming driving activities was made.    A history of idiopathic thrombocytopenic purpura (ITP) is noted, with a follow-up appointment with the hematologist scheduled for tomorrow. Aspirin was inadvertently taken this morning due to a lack of awareness about its discontinuation.    SOCIAL HISTORY  Exercise: Walking around the yard and gravel.         Past Medical History:   Diagnosis Date    Arrhythmia     CAD (coronary artery disease)     Hyperlipidemia     Hypertension     Immune thrombocytopenic purpura (HCC) 07/26/2019    PONV (postoperative nausea and vomiting)     Presence of pessary     Sleep apnea     Systemic lupus erythematosus (HCC) 07/26/2019    Thrombocytopenia, unspecified 07/26/2019      Past Surgical History:   Procedure Laterality Date    APPENDECTOMY      BREAST SURGERY      COLONOSCOPY      FOOT SURGERY Right     HYSTERECTOMY (CERVIX STATUS UNKNOWN)      TOTAL KNEE ARTHROPLASTY Right 3/19/2025    ROBOTIC RIGHT TOTAL KNEE ARTHROPLASTY performed by Chalino Caballero MD at [facility] OR    TUBAL LIGATION        Social History     Socioeconomic History    Marital 
No

## 2025-07-24 ENCOUNTER — EMERGENCY (EMERGENCY)
Facility: HOSPITAL | Age: 45
LOS: 0 days | Discharge: HOME | End: 2025-07-24
Attending: EMERGENCY MEDICINE
Payer: COMMERCIAL

## 2025-07-24 VITALS — WEIGHT: 161.38 LBS

## 2025-07-24 VITALS
HEART RATE: 51 BPM | RESPIRATION RATE: 16 BRPM | SYSTOLIC BLOOD PRESSURE: 170 MMHG | DIASTOLIC BLOOD PRESSURE: 86 MMHG | TEMPERATURE: 98 F | OXYGEN SATURATION: 100 %

## 2025-07-24 DIAGNOSIS — M79.604 PAIN IN RIGHT LEG: ICD-10-CM

## 2025-07-24 DIAGNOSIS — M79.651 PAIN IN RIGHT THIGH: ICD-10-CM

## 2025-07-24 LAB
APPEARANCE UR: CLEAR — SIGNIFICANT CHANGE UP
BILIRUB UR-MCNC: NEGATIVE — SIGNIFICANT CHANGE UP
COLOR SPEC: YELLOW — SIGNIFICANT CHANGE UP
DIFF PNL FLD: NEGATIVE — SIGNIFICANT CHANGE UP
GLUCOSE UR QL: NEGATIVE MG/DL — SIGNIFICANT CHANGE UP
KETONES UR QL: NEGATIVE MG/DL — SIGNIFICANT CHANGE UP
LEUKOCYTE ESTERASE UR-ACNC: NEGATIVE — SIGNIFICANT CHANGE UP
NITRITE UR-MCNC: NEGATIVE — SIGNIFICANT CHANGE UP
PH UR: 5.5 — SIGNIFICANT CHANGE UP (ref 5–8)
PROT UR-MCNC: NEGATIVE MG/DL — SIGNIFICANT CHANGE UP
SP GR SPEC: 1.02 — SIGNIFICANT CHANGE UP (ref 1–1.03)
UROBILINOGEN FLD QL: 1 MG/DL — SIGNIFICANT CHANGE UP (ref 0.2–1)

## 2025-07-24 PROCEDURE — 93971 EXTREMITY STUDY: CPT | Mod: 26,RT

## 2025-07-24 PROCEDURE — 99284 EMERGENCY DEPT VISIT MOD MDM: CPT | Mod: 25

## 2025-07-24 PROCEDURE — 99284 EMERGENCY DEPT VISIT MOD MDM: CPT

## 2025-07-24 PROCEDURE — 93971 EXTREMITY STUDY: CPT | Mod: RT

## 2025-07-24 PROCEDURE — 96372 THER/PROPH/DIAG INJ SC/IM: CPT

## 2025-07-24 PROCEDURE — 81003 URINALYSIS AUTO W/O SCOPE: CPT

## 2025-07-24 RX ORDER — LIDOCAINE HYDROCHLORIDE 20 MG/ML
1 JELLY TOPICAL ONCE
Refills: 0 | Status: COMPLETED | OUTPATIENT
Start: 2025-07-24 | End: 2025-07-24

## 2025-07-24 RX ORDER — KETOROLAC TROMETHAMINE 30 MG/ML
30 INJECTION, SOLUTION INTRAMUSCULAR; INTRAVENOUS ONCE
Refills: 0 | Status: DISCONTINUED | OUTPATIENT
Start: 2025-07-24 | End: 2025-07-24

## 2025-07-24 RX ORDER — IBUPROFEN 200 MG
1 TABLET ORAL
Qty: 30 | Refills: 0
Start: 2025-07-24 | End: 2025-08-02

## 2025-07-24 RX ORDER — CYCLOBENZAPRINE HYDROCHLORIDE 15 MG/1
1 CAPSULE, EXTENDED RELEASE ORAL
Qty: 15 | Refills: 0
Start: 2025-07-24 | End: 2025-07-28

## 2025-07-24 RX ADMIN — LIDOCAINE HYDROCHLORIDE 1 PATCH: 20 JELLY TOPICAL at 08:39

## 2025-07-24 RX ADMIN — KETOROLAC TROMETHAMINE 30 MILLIGRAM(S): 30 INJECTION, SOLUTION INTRAMUSCULAR; INTRAVENOUS at 11:03

## 2025-07-24 RX ADMIN — KETOROLAC TROMETHAMINE 30 MILLIGRAM(S): 30 INJECTION, SOLUTION INTRAMUSCULAR; INTRAVENOUS at 08:40

## 2025-07-24 NOTE — ED PROVIDER NOTE - CLINICAL SUMMARY MEDICAL DECISION MAKING FREE TEXT BOX
46 y/o male with h/o back pain in ED c/o right inner thigh pain x 2 days today radiating to right lower back.   pt states took tylenol last night with no relief.   pt denies any trauma or falls.   pt denies any fever, HA, cp, sob, n/v/d/abd pain.   states also no urinary symptoms.   tolerating PO.   no sick contacts.   states worse with movement.   NAD.   back exam WNL.   mild tttp of right inner thigh.  no visible ecchymosis.   concern for possible DVT vs MSK pain vs uti.   will check US, UA, meds and reeval 46 y/o male with h/o back pain in ED c/o right inner thigh pain x 2 days today radiating to right lower back.   pt states took tylenol last night with no relief.   pt denies any trauma or falls.   pt denies any fever, HA, cp, sob, n/v/d/abd pain.   states also no urinary symptoms.   tolerating PO.   no sick contacts.   states worse with movement.   NAD.   back exam WNL.   mild tttp of right inner thigh.  no visible ecchymosis.   concern for possible DVT vs MSK pain vs uti.   will check US, UA, meds and reeval      reeval at 1025:   pt told of results.   states still with some pain.   pt resting comfortably with NAD>   pt agree with dose of percocet and d/c home with scripts and will f/u

## 2025-07-24 NOTE — ED ADULT NURSE REASSESSMENT NOTE - NS ED NURSE REASSESS COMMENT FT1
Pt states pain did not improve after medication and is requesting further medication at this time. Dr. Juarez made aware and awaiting orders. Pt resting comfortably in bed with no signs of acute distress at this time.

## 2025-07-24 NOTE — ED ADULT TRIAGE NOTE - CHIEF COMPLAINT QUOTE
pt presents to ED due to right leg pain x 1 week radiating now up to lower right sided back pain tylenol 650mg 11pm last night with no relief

## 2025-07-24 NOTE — ED PROVIDER NOTE - NSFOLLOWUPINSTRUCTIONS_ED_ALL_ED_FT
please follow up with your doctor in 2-3 days.   take medications as prescribed.   may use ice or heating pads for comfort.   drink plenty of fluids.  return to ED for any concerns

## 2025-07-24 NOTE — ED ADULT NURSE NOTE - NSFALLUNIVINTERV_ED_ALL_ED
No Bed/Stretcher in lowest position, wheels locked, appropriate side rails in place/Call bell, personal items and telephone in reach/Instruct patient to call for assistance before getting out of bed/chair/stretcher/Non-slip footwear applied when patient is off stretcher/[place] to call system/Physically safe environment - no spills, clutter or unnecessary equipment/Purposeful proactive rounding/Room/bathroom lighting operational, light cord in reach

## 2025-07-24 NOTE — ED ADULT NURSE NOTE - OBJECTIVE STATEMENT
Pt is a 44 y/o male presenting to the ED with right groin pain radiating up to the right lower and upper back x 1 week. Pt denies injury, traumas, or falls. Pt reports urinary urgency and pain while urinating x 1 week as well, denies hematuria. Pt denies bloody stools, abdominal pain, chest pain, headache, SOB at this time. Pt took tylenol last night with no relief.

## 2025-07-24 NOTE — ED PROVIDER NOTE - PATIENT PORTAL LINK FT
You can access the FollowMyHealth Patient Portal offered by [facility] by registering at the following website: [email or web address]. By joining [employer]’s FollowMyHealth portal, you will also be able to view your health information using other applications (apps) compatible with our system.

## 2025-07-24 NOTE — ED PROVIDER NOTE - OBJECTIVE STATEMENT
44 y/o male with h/o back pain in ED c/o right inner thigh pain x 2 days today radiating to right lower back.   pt states took tylenol last night with no relief.   pt denies any trauma or falls.   pt denies any fever, HA, cp, sob, n/v/d/abd pain.   states also no urinary symptoms.   tolerating PO.   no sick contacts.   states worse with movement.

## 2025-07-25 ENCOUNTER — EMERGENCY (EMERGENCY)
Facility: HOSPITAL | Age: 45
LOS: 0 days | Discharge: HOME | End: 2025-07-25
Attending: EMERGENCY MEDICINE
Payer: COMMERCIAL

## 2025-07-25 VITALS
TEMPERATURE: 98 F | SYSTOLIC BLOOD PRESSURE: 147 MMHG | OXYGEN SATURATION: 100 % | DIASTOLIC BLOOD PRESSURE: 97 MMHG | HEART RATE: 66 BPM | RESPIRATION RATE: 18 BRPM

## 2025-07-25 VITALS — HEIGHT: 72 IN

## 2025-07-25 DIAGNOSIS — R10.31 RIGHT LOWER QUADRANT PAIN: ICD-10-CM

## 2025-07-25 DIAGNOSIS — M54.50 LOW BACK PAIN, UNSPECIFIED: ICD-10-CM

## 2025-07-25 DIAGNOSIS — I10 ESSENTIAL (PRIMARY) HYPERTENSION: ICD-10-CM

## 2025-07-25 LAB
ALBUMIN SERPL ELPH-MCNC: 3.6 G/DL — SIGNIFICANT CHANGE UP (ref 3.3–5)
ALP SERPL-CCNC: 70 U/L — SIGNIFICANT CHANGE UP (ref 40–120)
ALT FLD-CCNC: 27 U/L — SIGNIFICANT CHANGE UP (ref 12–78)
ANION GAP SERPL CALC-SCNC: 2 MMOL/L — LOW (ref 5–17)
APPEARANCE UR: CLEAR — SIGNIFICANT CHANGE UP
AST SERPL-CCNC: 15 U/L — SIGNIFICANT CHANGE UP (ref 15–37)
BASOPHILS # BLD AUTO: 0.02 K/UL — SIGNIFICANT CHANGE UP (ref 0–0.2)
BASOPHILS NFR BLD AUTO: 0.4 % — SIGNIFICANT CHANGE UP (ref 0–2)
BILIRUB SERPL-MCNC: 1.2 MG/DL — SIGNIFICANT CHANGE UP (ref 0.2–1.2)
BILIRUB UR-MCNC: NEGATIVE — SIGNIFICANT CHANGE UP
BUN SERPL-MCNC: 12 MG/DL — SIGNIFICANT CHANGE UP (ref 7–23)
CALCIUM SERPL-MCNC: 8.5 MG/DL — SIGNIFICANT CHANGE UP (ref 8.5–10.1)
CHLORIDE SERPL-SCNC: 107 MMOL/L — SIGNIFICANT CHANGE UP (ref 96–108)
CO2 SERPL-SCNC: 30 MMOL/L — SIGNIFICANT CHANGE UP (ref 22–31)
COLOR SPEC: YELLOW — SIGNIFICANT CHANGE UP
CREAT SERPL-MCNC: 1.24 MG/DL — SIGNIFICANT CHANGE UP (ref 0.5–1.3)
DIFF PNL FLD: NEGATIVE — SIGNIFICANT CHANGE UP
EGFR: 74 ML/MIN/1.73M2 — SIGNIFICANT CHANGE UP
EGFR: 74 ML/MIN/1.73M2 — SIGNIFICANT CHANGE UP
EOSINOPHIL # BLD AUTO: 0.07 K/UL — SIGNIFICANT CHANGE UP (ref 0–0.5)
EOSINOPHIL NFR BLD AUTO: 1.5 % — SIGNIFICANT CHANGE UP (ref 0–6)
GLUCOSE SERPL-MCNC: 85 MG/DL — SIGNIFICANT CHANGE UP (ref 70–99)
GLUCOSE UR QL: NEGATIVE MG/DL — SIGNIFICANT CHANGE UP
HCT VFR BLD CALC: 47.8 % — SIGNIFICANT CHANGE UP (ref 39–50)
HCV AB S/CO SERPL IA: 0.12 S/CO — SIGNIFICANT CHANGE UP (ref 0–0.79)
HCV AB SERPL-IMP: SIGNIFICANT CHANGE UP
HGB BLD-MCNC: 15.3 G/DL — SIGNIFICANT CHANGE UP (ref 13–17)
HIV 1 & 2 AB SERPL IA.RAPID: SIGNIFICANT CHANGE UP
IMM GRANULOCYTES # BLD AUTO: 0.01 K/UL — SIGNIFICANT CHANGE UP (ref 0–0.07)
IMM GRANULOCYTES NFR BLD AUTO: 0.2 % — SIGNIFICANT CHANGE UP (ref 0–0.9)
KETONES UR QL: NEGATIVE MG/DL — SIGNIFICANT CHANGE UP
LEUKOCYTE ESTERASE UR-ACNC: NEGATIVE — SIGNIFICANT CHANGE UP
LIDOCAIN IGE QN: 21 U/L — SIGNIFICANT CHANGE UP (ref 13–75)
LYMPHOCYTES # BLD AUTO: 1.83 K/UL — SIGNIFICANT CHANGE UP (ref 1–3.3)
LYMPHOCYTES NFR BLD AUTO: 40 % — SIGNIFICANT CHANGE UP (ref 13–44)
MCHC RBC-ENTMCNC: 28.5 PG — SIGNIFICANT CHANGE UP (ref 27–34)
MCHC RBC-ENTMCNC: 32 G/DL — SIGNIFICANT CHANGE UP (ref 32–36)
MCV RBC AUTO: 89.2 FL — SIGNIFICANT CHANGE UP (ref 80–100)
MONOCYTES # BLD AUTO: 0.25 K/UL — SIGNIFICANT CHANGE UP (ref 0–0.9)
MONOCYTES NFR BLD AUTO: 5.5 % — SIGNIFICANT CHANGE UP (ref 2–14)
NEUTROPHILS # BLD AUTO: 2.39 K/UL — SIGNIFICANT CHANGE UP (ref 1.8–7.4)
NEUTROPHILS NFR BLD AUTO: 52.4 % — SIGNIFICANT CHANGE UP (ref 43–77)
NITRITE UR-MCNC: NEGATIVE — SIGNIFICANT CHANGE UP
NRBC # BLD AUTO: 0 K/UL — SIGNIFICANT CHANGE UP (ref 0–0)
NRBC # FLD: 0 K/UL — SIGNIFICANT CHANGE UP (ref 0–0)
NRBC BLD AUTO-RTO: 0 /100 WBCS — SIGNIFICANT CHANGE UP (ref 0–0)
PH UR: 5.5 — SIGNIFICANT CHANGE UP (ref 5–8)
PLATELET # BLD AUTO: 192 K/UL — SIGNIFICANT CHANGE UP (ref 150–400)
PMV BLD: 10.2 FL — SIGNIFICANT CHANGE UP (ref 7–13)
POTASSIUM SERPL-MCNC: 4.3 MMOL/L — SIGNIFICANT CHANGE UP (ref 3.5–5.3)
POTASSIUM SERPL-SCNC: 4.3 MMOL/L — SIGNIFICANT CHANGE UP (ref 3.5–5.3)
PROT SERPL-MCNC: 6.9 GM/DL — SIGNIFICANT CHANGE UP (ref 6–8.3)
PROT UR-MCNC: NEGATIVE MG/DL — SIGNIFICANT CHANGE UP
RBC # BLD: 5.36 M/UL — SIGNIFICANT CHANGE UP (ref 4.2–5.8)
RBC # FLD: 12.8 % — SIGNIFICANT CHANGE UP (ref 10.3–14.5)
SODIUM SERPL-SCNC: 139 MMOL/L — SIGNIFICANT CHANGE UP (ref 135–145)
SP GR SPEC: 1.01 — SIGNIFICANT CHANGE UP (ref 1–1.03)
UROBILINOGEN FLD QL: 1 MG/DL — SIGNIFICANT CHANGE UP (ref 0.2–1)
WBC # BLD: 4.57 K/UL — SIGNIFICANT CHANGE UP (ref 3.8–10.5)
WBC # FLD AUTO: 4.57 K/UL — SIGNIFICANT CHANGE UP (ref 3.8–10.5)

## 2025-07-25 PROCEDURE — 80053 COMPREHEN METABOLIC PANEL: CPT

## 2025-07-25 PROCEDURE — 74176 CT ABD & PELVIS W/O CONTRAST: CPT

## 2025-07-25 PROCEDURE — 99285 EMERGENCY DEPT VISIT HI MDM: CPT

## 2025-07-25 PROCEDURE — 74176 CT ABD & PELVIS W/O CONTRAST: CPT | Mod: 26

## 2025-07-25 PROCEDURE — 96375 TX/PRO/DX INJ NEW DRUG ADDON: CPT

## 2025-07-25 PROCEDURE — 81003 URINALYSIS AUTO W/O SCOPE: CPT

## 2025-07-25 PROCEDURE — 36415 COLL VENOUS BLD VENIPUNCTURE: CPT

## 2025-07-25 PROCEDURE — 99284 EMERGENCY DEPT VISIT MOD MDM: CPT | Mod: 25

## 2025-07-25 PROCEDURE — 85025 COMPLETE CBC W/AUTO DIFF WBC: CPT

## 2025-07-25 PROCEDURE — 86803 HEPATITIS C AB TEST: CPT

## 2025-07-25 PROCEDURE — 96374 THER/PROPH/DIAG INJ IV PUSH: CPT

## 2025-07-25 PROCEDURE — 83690 ASSAY OF LIPASE: CPT

## 2025-07-25 PROCEDURE — 86703 HIV-1/HIV-2 1 RESULT ANTBDY: CPT

## 2025-07-25 RX ORDER — LIDOCAINE HYDROCHLORIDE 20 MG/ML
1 JELLY TOPICAL
Qty: 2 | Refills: 0
Start: 2025-07-25 | End: 2025-08-03

## 2025-07-25 RX ORDER — DEXAMETHASONE 0.5 MG/1
6 TABLET ORAL ONCE
Refills: 0 | Status: COMPLETED | OUTPATIENT
Start: 2025-07-25 | End: 2025-07-25

## 2025-07-25 RX ORDER — METHYLPREDNISOLONE ACETATE 80 MG/ML
1 INJECTION, SUSPENSION INTRA-ARTICULAR; INTRALESIONAL; INTRAMUSCULAR; SOFT TISSUE
Qty: 1 | Refills: 0
Start: 2025-07-25

## 2025-07-25 RX ADMIN — DEXAMETHASONE 6 MILLIGRAM(S): 0.5 TABLET ORAL at 13:40

## 2025-07-25 RX ADMIN — Medication 2000 MILLILITER(S): at 11:36

## 2025-07-25 RX ADMIN — Medication 4 MILLIGRAM(S): at 11:37

## 2025-07-25 NOTE — ED STATDOCS - NSFOLLOWUPINSTRUCTIONS_ED_ALL_ED_FT
You were seen in the ED for back pain on the right.   A copy of the results from today's visit is provided to you.     Please follow-up with a spine doctor for further evaluation of this back pain.     Take medrol dose-pack as directed - start taking this tomorrow as you received a dose of steroids in the ED today.   Take Tylenol 650mg (Two regular strength 325 mg pills) every 4-6 hours or two extra strength 500mg tablets every 8 hours as needed for pain or fevers. Do not exceed 1000mg at one time or 4000mg in a 24 hour period. Take Motrin (also sold as Advil or Ibuprofen) 400-600 mg (two or three 200 mg over the counter pills) every 8 hours as needed for moderate pain or fevers-- take with food; do not take for more than 5 consecutive days.   Take Flexeril 5 mg every 8 hours as needed for muscle spasm -- causes drowsiness; no drinking alcohol or driving with this medication.  Apply lidocaine patch to affected area; this is available over the counter, follow instructions on its packaging. Remove after 12 hours.    Return to the ED for any new or worsening symptoms.     [facility]  Orthopedic Surgery  [address] [place] [address]  Phone: [phone]    [facility]  Orthopedics  [place] [address]  Phone: [phone]  Fax:   Follow Up Time:     Back Pain  WHAT YOU NEED TO KNOW:  Back pain is common. It can be caused by many conditions, such as arthritis or the breakdown of spinal discs. Your risk for back pain is increased by injuries, lack of activity, or repeated bending and twisting. You may feel sore or stiff on one or both sides of your back. The pain may spread to your buttocks or thighs.  DISCHARGE INSTRUCTIONS:  Return to the emergency department if:   •You have pain, numbness, or weakness in one or both legs.  •Your pain becomes so severe that you cannot walk.  •You cannot control your urine or bowel movements.  •You have severe back pain with chest pain.  •You have severe back pain, nausea, and vomiting.  •You have severe back pain that spreads to your side or genital area.  Contact your healthcare provider if:   •You have back pain that does not get better with rest and pain medicine.  •You have a fever.  •You have pain that worsens when you are on your back or when you rest.  •You have pain that worsens when you cough or sneeze.  •You lose weight without trying.  •You have questions or concerns about your condition or care.  Medicines:   •NSAIDs help decrease swelling and pain. This medicine is available with or without a doctor's order. NSAIDs can cause stomach bleeding or kidney problems in certain people. If you take blood thinner medicine, always ask your healthcare provider if NSAIDs are safe for you. Always read the medicine label and follow directions.  •Acetaminophen decreases pain and fever. It is available without a doctor's order. Ask how much to take and how often to take it. Follow directions. Read the labels of all other medicines you are using to see if they also contain acetaminophen, or ask your doctor or pharmacist. Acetaminophen can cause liver damage if not taken correctly. Do not use more than 4 grams (4,000 milligrams) total of acetaminophen in one day.   •Muscle relaxers help decrease muscle spasms and back pain.  •Prescription pain medicine may be given. Ask your healthcare provider how to take this medicine safely. Some prescription pain medicines contain acetaminophen. Do not take other medicines that contain acetaminophen without talking to your healthcare provider. Too much acetaminophen may cause liver damage. Prescription pain medicine may cause constipation. Ask your healthcare provider how to prevent or treat constipation.   •Take your medicine as directed. Contact your healthcare provider if you think your medicine is not helping or if you have side effects. Tell him or her if you are allergic to any medicine. Keep a list of the medicines, vitamins, and herbs you take. Include the amounts, and when and why you take them. Bring the list or the pill bottles to follow-up visits. Carry your medicine list with you in case of an emergency.  How to manage your back pain:   •Apply ice on your back for 15 to 20 minutes every hour or as directed. Use an ice pack, or put crushed ice in a plastic bag. Cover it with a towel before you apply it to your skin. Ice helps prevent tissue damage and decreases pain.  •Apply heat on your back for 20 to 30 minutes every 2 hours for as many days as directed. Heat helps decrease pain and muscle spasms.  •Stay active as much as you can without causing more pain. Bed rest could make your back pain worse. Avoid heavy lifting until your pain is gone.  •Go to physical therapy as directed. A physical therapist can teach you exercises to help improve movement and strength, and to decrease pain.  Follow up with your healthcare provider in 2 weeks, or as directed: Write down your questions so you remember to ask them during your visits.

## 2025-07-25 NOTE — ED ADULT NURSE NOTE - OBJECTIVE STATEMENT
pt c/o right flank pain today. Started with leg pain on monday, turning into right back pain which brought him in on wednesday and now right flank pain. c/o slight burning when he urinated for the urine sample in ED. denies n/v/d.

## 2025-07-25 NOTE — ED STATDOCS - PATIENT PORTAL LINK FT
You can access the FollowMyHealth Patient Portal offered by [facility] by registering at the following website: [email or web address]. By joining [employer]’s FollowMyHealth portal, you will also be able to view your health information using other applications (apps) compatible with our system.

## 2025-07-25 NOTE — ED STATDOCS - CARE PROVIDER_API CALL
Javi Isaac ()  Orthopaedic Surgery  [address] [place] [id number]  Phone: [phone]  Fax: [phone]  Follow Up Time:

## 2025-07-25 NOTE — ED ADULT NURSE NOTE - NSFALLUNIVINTERV_ED_ALL_ED
Bed/Stretcher in lowest position, wheels locked, appropriate side rails in place/Call bell, personal items and telephone in reach/Instruct patient to call for assistance before getting out of bed/chair/stretcher/Non-slip footwear applied when patient is off stretcher/[place] to call system/Physically safe environment - no spills, clutter or unnecessary equipment/Purposeful proactive rounding/Room/bathroom lighting operational, light cord in reach

## 2025-07-25 NOTE — ED STATDOCS - OBJECTIVE STATEMENT
44 y/o male with PMHx of HTN presenting to the ED c/o flank pain since yesterday that's not subsiding. patient reports it hurts more when he moves. Patient states he came to the ed yesterday for the same pain and was prescribed medications and got a sonogram. Patient reports and says other pain has relieved but now has pain in his lower left back. Patient reports taking ibuprofen at 6:50 and saw no relief. Patient denies coughing, recent travel, heavy lifting fever, insect bites and rashes

## 2025-07-25 NOTE — ED STATDOCS - PHYSICAL EXAMINATION
Constitutional: NAD AAOx3  Eyes: EOMI, pupils equal  Head: Normocephalic atraumatic  Mouth: no airway obstruction  Cardiac: regular rate   Resp: Lungs CTAB  GI: Abd s/nt/nd negative CVAT,   Neuro: CN2-12 intact, distally neurovascularly intact to the lower extremities   Skin: No rashes

## 2025-07-25 NOTE — ED STATDOCS - PROGRESS NOTE DETAILS
ASA Gill: 46 y/o M with pmhx of HTN presents to the ED c/o right flank pain since yesterday. pt was seen in the ED yesterday for similar pain, however at that time pain was mainly in the right thigh/groin with radiation up to the right flank. pt had urine and DVT US of RLE with no significant findings and so pt was treated for MSK pain - given flexeril and ibuprofen. Pt last took flexeril last night and ibuprofen this morning without significant relief of pain. Pain is worse with movement, tolerable and improved while at rest. Denies recent trauma/fall, f/c, rash, cough, recent travel, CP, SOB, dysuria/hematuria, increased urinary frequency, recent heavy lifting. No past surgical hx. NKDA.  Physical exam: Well appearing, in no acute distress. +tenderness to palpation over right paraspinal region, no CVA tenderness b/l. no midline spinal tenderness. No rashes noted.   Plan: labs, urine studies, CT. ASA Gill: labs unremarkable. UA without signs of infection, within normal limits. CT with no acute findings.   Pt reassessed - states that pain has improved. Spoke with pt regarding results - likely msk in nature. Referral to spine given to pt. Steroids given in the ED, will send medrol dose pack to pt's pharmacy. pt encouraged to continue taking medications that were given to him yesterday. Work note provided. Strict return precautions given. stable for d/c at this time

## 2025-07-25 NOTE — ED ADULT TRIAGE NOTE - CHIEF COMPLAINT QUOTE
Pt c/o R flank pain x1 week. Unrelieved by Motrin and muscle relaxers. Denies N/V, urinary symptoms, and fever.

## 2025-07-25 NOTE — ED STATDOCS - CLINICAL SUMMARY MEDICAL DECISION MAKING FREE TEXT BOX
Patient here with right flank plan last seen here at  for right leg pain which has now subsided plan: CT scans and labs

## 2025-07-28 PROBLEM — Z00.00 ENCOUNTER FOR PREVENTIVE HEALTH EXAMINATION: Status: ACTIVE | Noted: 2025-07-28

## 2025-07-29 ENCOUNTER — APPOINTMENT (OUTPATIENT)
Dept: NEUROSURGERY | Facility: CLINIC | Age: 45
End: 2025-07-29

## 2025-08-12 ENCOUNTER — APPOINTMENT (OUTPATIENT)
Dept: NEUROSURGERY | Facility: CLINIC | Age: 45
End: 2025-08-12
Payer: COMMERCIAL

## 2025-08-12 VITALS
OXYGEN SATURATION: 99 % | SYSTOLIC BLOOD PRESSURE: 164 MMHG | WEIGHT: 160 LBS | DIASTOLIC BLOOD PRESSURE: 104 MMHG | HEART RATE: 65 BPM | BODY MASS INDEX: 22.4 KG/M2 | HEIGHT: 71 IN

## 2025-08-12 DIAGNOSIS — R52 PAIN, UNSPECIFIED: ICD-10-CM

## 2025-08-12 DIAGNOSIS — M79.2 NEURALGIA AND NEURITIS, UNSPECIFIED: ICD-10-CM

## 2025-08-12 PROCEDURE — 99203 OFFICE O/P NEW LOW 30 MIN: CPT

## 2025-08-26 ENCOUNTER — APPOINTMENT (OUTPATIENT)
Dept: MRI IMAGING | Facility: CLINIC | Age: 45
End: 2025-08-26

## 2025-08-29 ENCOUNTER — APPOINTMENT (OUTPATIENT)
Dept: PHYSICAL MEDICINE AND REHAB | Facility: CLINIC | Age: 45
End: 2025-08-29